# Patient Record
Sex: MALE | Race: WHITE | NOT HISPANIC OR LATINO | Employment: FULL TIME | URBAN - METROPOLITAN AREA
[De-identification: names, ages, dates, MRNs, and addresses within clinical notes are randomized per-mention and may not be internally consistent; named-entity substitution may affect disease eponyms.]

---

## 2017-01-04 ENCOUNTER — TELEPHONE (OUTPATIENT)
Dept: PODIATRY | Facility: CLINIC | Age: 57
End: 2017-01-04

## 2017-01-05 NOTE — TELEPHONE ENCOUNTER
I called and LVM.  Recommend he fax forms to [facility] today, so we can fill out form as quickly as possible, fax numbers given.    Deion Pitts DPM   Podiatric Foot & Ankle Surgeon  [facility]  [phone]

## 2017-01-06 ENCOUNTER — OFFICE VISIT (OUTPATIENT)
Dept: PODIATRY | Facility: CLINIC | Age: 57
End: 2017-01-06
Payer: COMMERCIAL

## 2017-01-06 VITALS — BODY MASS INDEX: 33.41 KG/M2 | HEIGHT: 75 IN | WEIGHT: 268.7 LBS

## 2017-01-06 DIAGNOSIS — L97.529 DIABETIC ULCER OF LEFT FOOT ASSOCIATED WITH TYPE 2 DIABETES MELLITUS (H): Primary | ICD-10-CM

## 2017-01-06 DIAGNOSIS — E11.621 DIABETIC ULCER OF LEFT FOOT ASSOCIATED WITH TYPE 2 DIABETES MELLITUS (H): Primary | ICD-10-CM

## 2017-01-06 PROCEDURE — 11042 DBRDMT SUBQ TIS 1ST 20SQCM/<: CPT | Performed by: PODIATRIST

## 2017-01-06 PROCEDURE — 99213 OFFICE O/P EST LOW 20 MIN: CPT | Mod: 25 | Performed by: PODIATRIST

## 2017-01-06 NOTE — PATIENT INSTRUCTIONS
Follow Up - 3 week follow up     Dr. Pitts's Clinic Locations:         Monday Tuesday   [facility]   [address], [address]   [place] [address] [place], [place] [address] [phone]       Wednesday:  Surgery Day    Surgery Scheduling Line - [phone]       Thursday Morning Thursday Afternoon   [facility]   [address], [address]   [place] [address] [place], [place] [address] [phone]       Friday Morning To Schedule an Appointment    [facility] Call: [phone] [address] [place] [address]  [phone] PLEASE FAX ALL FORMS TO: [phone]     A List of Nail/Callus Care Alternatives  [facility]    [phone]    $40.00 per visit   [facility]   [phone] [address] [place] [address]     Footworks  [phone]  Services the [facility]  $32 per visit   [facility]   Foot Care Clinic    [phone]    At your home or at [place] [address] [place] [address]      [facility]    [phone] [address], [place] [address] [phone] [address], [place] [address]  [facility]    [phone]    Routine foot care for older and diabetic feet by a licensed nurse in your home.      [facility]    [place]  [address]   [place] [address]  Ph: [phone]  Fax: [phone]    [place] ([facility])  [address]  [place] [address]  Ph: [phone]  Fax: [phone]    [place]  [address], [address]   [place] [address]  Ph: [phone]  Fax: [phone]    [address]   [place] [address]  Ph: [phone]  Fax:  [phone]    [place]  [address]   [place] [address]  Ph: [phone]  Fax: [phone]    [facility]   [address], [place]: [phone]    [place] (knee walkers and power scooters)   [address] [facility] [address] [place]: [phone]  [address]: [phone].      Dr. Case's Clinic Locations    Monday Tuesday  [facility]  [address]         [address]  [place] [address]      [place] [address]  Ph:  [phone]      Ph: [phone]  Fax: [phone]      Fax: [phone]    Wednesday Thursday - Surgery Day  [facility]     Call Megan @ [phone]  [address] [place] [address]        [place] [address]  Ph: [phone]  Fax: [phone]

## 2017-01-06 NOTE — PROGRESS NOTES
"Foot & Ankle Surgery  January 6, 2017    S:  Patient in today approx 7 days sp biopsy of bone left foot by Dr Case.  Pain levels minimal.  NWB with crutches.  Would like a note to return to work from home, seated duties.  [facility] nursing for wound cares, using aquacel Ag.  Need order for wound care supplies    Ht 6' 3\" (1.905 m)  Wt 268 lb 11.2 oz (121.882 kg)  BMI 33.59 kg/m2      ROS - positive for CC.  Patient denies current nausea, vomiting, chills, fevers, belly pain, calf pain, chest pain or SOB.  Complete remainder of ROS is otherwise neg.    PE - wound is full-thickness, 1.6 x 1.1cm x 0.7cm.  No probing to bone, no SOI.  Dorsal incisions from biopsies are healed well without drainage/SOI.  charcot.  Skin shows no trophic, color or temperature changes otherwise.  No calf redness, swelling or pain noted otherwise.    Imaging - IMPRESSION:  1. Penetrating soft tissue wound along the plantar aspect of the  midfoot beneath the calcaneus. This extends nearly to the peroneus  longus tendon. Minimal peroneal tendon sheath fluid is noted on the  proximal edge of the scan. No other evidence of rim-enhancing soft  tissue abscess.  2. Marked irregularity, subchondral cysts, and subchondral marrow  edema in the fourth/fifth tarsometatarsal joint and talonavicular  joint. Given proximity to this wound, osteomyelitis/septic arthritis  cannot be entirely excluded. However, this may be related to  developing neuroarthropathy rather than infection.  3. Soft tissue edema and enhancement. It should be noted that MR  cannot distinguish reactive edema from cellulitis.  4. Lack of soft tissue enhancement along the plantar lateral aspect of  the midfoot. This is of uncertain significance but raises the  possibility of lateral midfoot ischemia.    Pathology - FINAL DIAGNOSIS:   A., B., C. Bone, left foot metatarsal, left foot fourth metatarsal, and   left foot cuboid, biopsies.   - Small fragments of bone with attached " paracortical tissues distorted   by biopsy artifact.  No morphologic evidence of osteomyelitis.  Clinical   and culture correlation is required.    Cultures -   Culture Micro (Abnormal)      Moderate growth Beta hemolytic Streptococcus group B   Light growth Staphylococcus aureus   Light growth Strain 2 Staphylococcus aureus      Culture Micro (Abnormal)      On day 2, isolated in broth only: Pseudomonas aeruginosa not isolated or reported    on routine culture   No anaerobes isolated          A/P - 56 year old yo patient approx 1 week sp above procedure  -sutures removed, no s-s needed  -Excisional debridement was performed to the wound, full-thickness, sharply debridement the wound down to and including exposed sub-cutaneous tissue/fat, excising nonviable tissue to the above dimensions with a tissue nipper  -they are out of wound care supplies and aquacel Ag.  [facility]el Ag [facility], order for dressing supplies dispensed to patient/family  -note to return to seated work duties from home today, faxed to employer  -discussed NWB for ulcer healing  -discussed conservative(orthotics, CROW) vs surgical(resection of prominent bone vs Charcot reconstruction) once wound is healed     Follow up  -  3 weeks     Plan for izyroc-ow-ygti - today, seated duties, work from [place]lin    Billing based on time > 25 min, more than 50% spent c/c cares, excluding procedure time.    Body mass index is 33.59 kg/(m^2).  Weight management plan: Patient was referred to their PCP to discuss a diet and exercise plan.      Deion Pitts DPM   Podiatric Foot & Ankle Surgeon  [facility]  [phone]

## 2017-01-06 NOTE — MR AVS SNAPSHOT
After Visit Summary   1/6/2017    Riki Zepeda    MRN: [id number]           Patient Information     Date Of Birth          1960        Visit Information        Provider Department      1/6/2017 11:30 AM Deion Pitts DPM [facility]        Today's Diagnoses     Diabetic ulcer of left foot associated with type 2 diabetes mellitus (H)    -  1       Care Instructions    Follow Up - 3 week follow up     Dr. Pitts's Clinic Locations:         Monday Tuesday   [facility]   [address], [address]   [place] [address] [place] [address]   [phone]       Wednesday:  Surgery Day    Surgery Scheduling Line - [phone]       Thursday Morning Thursday Afternoon   [facility]   [address] [facility] [address] [facility], [address]   [place] [address] [place], [place] [address] [phone]       Friday Morning To Schedule an Appointment    [facility] Call: [phone] [address] [place] [address]  [phone] PLEASE FAX ALL FORMS TO: [phone]     A List of Nail/Callus Care Alternatives  [facility]    [phone]    $40.00 per visit   [facility]   [phone]  [address] [place] [address]     Footworks  [phone]  Services the [facility]  $32 per visit   [facility]   Foot Care Clinic    [phone]    At your home or at [facility]   [address] [place] [address]      [facility]    [phone] [address], [place], [place] [address] [phone] [address], [place] [address]  [facility]    [phone]    Routine foot care for older and diabetic feet by a licensed nurse in your home.      [facility]    [place]  [address]   [place] [address]  Ph: [phone]  Fax: [phone]    [place] ([facility])  [address]  [place] [address]  Ph: [phone]  Fax: [phone]    [place]  [address]   [place] [address]  Ph: [phone]  Fax: [phone]    [place]  [address]    [address]  Ph: [phone]  Fax: [phone]    [place]  [address]   [place] [address]  Ph: [phone]  Fax: [phone]    [facility]   [address] [place]: [phone]    [place] (knee walkers and power scooters)   [address], [place]: [phone]  [address] [place]: [phone].            Follow-ups after your visit        Who to contact     If you have questions or need follow up information about today's clinic visit or your schedule please contact [facility] directly at [phone].  Normal or non-critical lab and imaging results will be communicated to you by stickappshart, letter or phone within 4 business days after the clinic has received the results. If you do not hear from us within 7 days, please contact the clinic through stickappshart or phone. If you have a critical or abnormal lab result, we will notify you by phone as soon as possible.  Submit refill requests through Pirate Pay or call your pharmacy and they will forward the refill request to us. Please allow 3 business days for your refill to be completed.          Additional Information About Your Visit        Pirate Pay Information     Pirate Pay lets you send messages to your doctor, view your test results, renew your prescriptions, schedule appointments and more. To sign up, go to [email or web address] . Click on \"Log in\" on the left side of the screen, which will take you to the Welcome page. Then click on \"Sign up Now\" on the right side of the page.     You will be asked to enter the access code listed below, as well as " "some personal information. Please follow the directions to create your username and password.     Your access code is: [email or web address]  Expires: 2017  2:42 PM     Your access code will  in 90 days. If you need help or a new code, please call your [facility] or [phone].        Care EveryWhere ID     This is your Care EveryWhere ID. This could be used by other organizations to access your [place] medical records  [id number]        Your Vitals Were     Height BMI (Body Mass Index)                6' 3\" (1.905 m) 33.59 kg/m2           Blood Pressure from Last 3 Encounters:   17 136/73   16 117/77    Weight from Last 3 Encounters:   17 268 lb 11.2 oz (121.882 kg)   17 268 lb 11.2 oz (121.882 kg)   16 240 lb (108.863 kg)              We Performed the Following     WOUND CARE SUPPLIES        Primary Care Provider Office Phone # Fax #    Shannan [facility] [phone]       [facility] [place] [id number]        Thank you!     Thank you for choosing [facility]  for your care. Our goal is always to provide you with excellent care. Hearing back from our patients is one way we can continue to improve our services. Please take a few minutes to complete the written survey that you may receive in the mail after your visit with us. Thank you!             Your Updated Medication List - Protect others around you: Learn how to safely use, store and throw away your medicines at www.disposemymeds.org.          This list is accurate as of: 17 12:06 PM.  Always use your most recent med list.                   Brand Name Dispense Instructions for use    amoxicillin-clavulanate 875-125 MG per tablet    AUGMENTIN    28 tablet    Take 1 tablet by mouth 2 times daily for 14 days       ciprofloxacin 500 MG tablet    CIPRO    28 tablet    Take 1 tablet (500 mg) by mouth 2 times daily for 14 days       ibuprofen 200 MG tablet    ADVIL/MOTRIN     Take 600 " mg by mouth every 6 hours as needed for mild pain       insulin glargine 100 UNIT/ML injection    LANTUS     Inject 42 Units Subcutaneous At Bedtime       insulin pen needle 31G X 6 MM     100 each    Use 4 daily or as directed.       METFORMIN HCL PO      Take 1,000 mg by mouth 2 times daily (with meals)       silver sulfADIAZINE 1 % cream    SILVADENE     Apply topically daily Apply to L foot wound

## 2017-01-23 ENCOUNTER — OFFICE VISIT (OUTPATIENT)
Dept: PODIATRY | Facility: CLINIC | Age: 57
End: 2017-01-23
Payer: COMMERCIAL

## 2017-01-23 VITALS — WEIGHT: 268 LBS | HEIGHT: 75 IN | BODY MASS INDEX: 33.32 KG/M2 | RESPIRATION RATE: 16 BRPM

## 2017-01-23 DIAGNOSIS — L97.529 DIABETIC ULCER OF LEFT FOOT ASSOCIATED WITH TYPE 2 DIABETES MELLITUS (H): Primary | ICD-10-CM

## 2017-01-23 DIAGNOSIS — E11.621 DIABETIC ULCER OF LEFT FOOT ASSOCIATED WITH TYPE 2 DIABETES MELLITUS (H): Primary | ICD-10-CM

## 2017-01-23 PROCEDURE — 99213 OFFICE O/P EST LOW 20 MIN: CPT | Performed by: PODIATRIST

## 2017-01-23 NOTE — PROGRESS NOTES
"Foot & Ankle Surgery  January 23, 2017    S:  Patient in today approx 3 1/2 weeks sp wound debridement and bone biopsy.  Pain levels neg.  Doing daily aquacel Ag dressing changes, NWB on crutches, and is working from home.  His wife is asking about nail fungus    Resp 16  Ht 6' 3\" (1.905 m)  Wt 268 lb (121.564 kg)  BMI 33.50 kg/m2      ROS - positive for CC.  Patient denies current nausea, vomiting, chills, fevers, belly pain, calf pain, chest pain or SOB.  Complete remainder of ROS is otherwise neg.    PE - 1.4 x 1.0cm, full-thickness centrally but otherwise partial thickness, granular base after debridement, no SOI/purulence.  Charcot collpase with plantar prominence.  Multiple nails thickened, dystrophic L foot  Skin shows no trophic, color or temperature changes otherwise.  No calf redness, swelling or pain noted otherwise.      A/P - 57 year old yo patient approx 3 1/2 sp above procedure  -Excisional debridement was performed to the wound, full-thickness, sharply debridement the wound down to and including exposed sub-cutaneous tissue/fat, excising nonviable tissue to the above dimensions with a tissue nipper  -continue daily cares:  Wash/dry, aquacel Ag, gauze dressing  -NWB  -discussed dystrophic nails, possible fungus, discussed culture and treatment briefly.  She just wants to make sure this doesn't infect the plantar wound.  No specific precautions other than cleaning wound  -asked about going to [facility]; they can be seen there for a 2nd opinion if they would like; otehrwise, we can treat in our clinic  -note to continue working from home x 2 months, will get back to work sooner if need be     Follow up  -  2 weeks     Plan for ohadmd-yg-oczh - working from home      Body mass index is 33.5 kg/(m^2).  Weight management plan: Patient was referred to their PCP to discuss a diet and exercise plan.      Deion Pitts, SINAN   Podiatric Foot & Ankle Surgeon  [facility]  [phone]

## 2017-01-23 NOTE — NURSING NOTE
"Chief Complaint   Patient presents with     Surgical Followup     DOS 12/29/17 L foot       Initial Resp 16  Ht 6' 3\" (1.905 m)  Wt 268 lb (121.564 kg)  BMI 33.50 kg/m2 Estimated body mass index is 33.5 kg/(m^2) as calculated from the following:    Height as of this encounter: 6' 3\" (1.905 m).    Weight as of this encounter: 268 lb (121.564 kg).    Berna Gilman CMA ([facility])  Podiatry/Foot & Ankle Surgery  [facility]      "

## 2017-01-23 NOTE — MR AVS SNAPSHOT
"              After Visit Summary   1/23/2017    Riki Zepeda    MRN: [id number]           Patient Information     Date Of Birth          1960        Visit Information        Provider Department      1/23/2017 8:30 AM Deion Pitts DPM [facility]         Follow-ups after your visit        Your next 10 appointments already scheduled     Feb 06, 2017  4:00 PM   Return Visit with Deion Pitts DPM   [facility] ([facility])    [address]  [address]  [facility] [address] [phone]              Who to contact     If you have questions or need follow up information about today's clinic visit or your schedule please contact [facility] directly at [phone].  Normal or non-critical lab and imaging results will be communicated to you by MyChart, letter or phone within 4 business days after the clinic has received the results. If you do not hear from us within 7 days, please contact the clinic through MyChart or phone. If you have a critical or abnormal lab result, we will notify you by phone as soon as possible.  Submit refill requests through Mobile-XL or call your pharmacy and they will forward the refill request to us. Please allow 3 business days for your refill to be completed.          Additional Information About Your Visit        Qoviahart Information     Mobile-XL lets you send messages to your doctor, view your test results, renew your prescriptions, schedule appointments and more. To sign up, go to [email or web address] . Click on \"Log in\" on the left side of the screen, which will take you to the Welcome page. Then click on \"Sign up Now\" on the right side of the page.     You will be asked to enter the access code listed below, as well as some personal information. Please follow the directions to create your username and password.     Your access code is: [email or web address]  Expires: 4/2/2017  2:42 PM     Your access code will " " in 90 days. If you need help or a new code, please call your [place] clinic or [phone].        Care EveryWhere ID     This is your Care EveryWhere ID. This could be used by other organizations to access your [place] medical records  [id number]        Your Vitals Were     Respirations Height BMI (Body Mass Index)             16 6' 3\" (1.905 m) 33.50 kg/m2          Blood Pressure from Last 3 Encounters:   17 136/73   16 117/77    Weight from Last 3 Encounters:   17 268 lb (121.564 kg)   17 268 lb 11.2 oz (121.882 kg)   17 268 lb 11.2 oz (121.882 kg)              Today, you had the following     No orders found for display       Primary Care Provider Office Phone # Fax #    Shannan [facility] [phone]       [facility] [address]        Thank you!     Thank you for choosing [facility]  for your care. Our goal is always to provide you with excellent care. Hearing back from our patients is one way we can continue to improve our services. Please take a few minutes to complete the written survey that you may receive in the mail after your visit with us. Thank you!             Your Updated Medication List - Protect others around you: Learn how to safely use, store and throw away your medicines at www.disposemymeds.org.          This list is accurate as of: 17 10:13 AM.  Always use your most recent med list.                   Brand Name Dispense Instructions for use    ibuprofen 200 MG tablet    ADVIL/MOTRIN     Take 600 mg by mouth every 6 hours as needed for mild pain       insulin glargine 100 UNIT/ML injection    LANTUS     Inject 42 Units Subcutaneous At Bedtime       insulin pen needle 31G X 6 MM     100 each    Use 4 daily or as directed.       METFORMIN HCL PO      Take 1,000 mg by mouth 2 times daily (with meals)       order for DME     1 Device    Equipment being ordered: RollAbout x 3 months       silver sulfADIAZINE " 1 % cream    SILVADENE     Apply topically daily Apply to L foot wound

## 2017-01-23 NOTE — Clinical Note
January 23, 2017      To whom it may concern:    It is ok for Riki Zepeda to continue working from home, where he can elevate and rest his foot, in an attempt to get the wound to heal as soon as possible.  We will continue with working from home x 2 months, but will see him every few weeks during that time to recheck the wound, and will get him back to work sooner if possible.      Thank you        Deion Pitts DPM   Podiatric Foot & Ankle Surgeon  [facility]

## 2017-02-06 ENCOUNTER — OFFICE VISIT (OUTPATIENT)
Dept: PODIATRY | Facility: CLINIC | Age: 57
End: 2017-02-06
Payer: COMMERCIAL

## 2017-02-06 DIAGNOSIS — L97.509 TYPE 2 DIABETES MELLITUS WITH FOOT ULCER, UNSPECIFIED LONG TERM INSULIN USE STATUS: Primary | ICD-10-CM

## 2017-02-06 DIAGNOSIS — E11.621 TYPE 2 DIABETES MELLITUS WITH FOOT ULCER, UNSPECIFIED LONG TERM INSULIN USE STATUS: Primary | ICD-10-CM

## 2017-02-06 PROCEDURE — 99213 OFFICE O/P EST LOW 20 MIN: CPT | Mod: 25 | Performed by: PODIATRIST

## 2017-02-06 PROCEDURE — 97597 DBRDMT OPN WND 1ST 20 CM/<: CPT | Performed by: PODIATRIST

## 2017-02-06 NOTE — PROGRESS NOTES
Foot & Ankle Surgery   February 6, 2017    S:  Pt is seen today for evaluation of ulcer plantar L midfoot.  NWB, although patient states he has put some weight on the foot, and qod dressings with Aquacel Ag, wound has been steadily improving.  Continues to work from home. No acute changes.    There were no vitals filed for this visit.'      ROS - Pos for CC.  Patient denies current nausea, vomiting, chills, fevers, belly pain, calf pain, chest pain or SOB.  Complete remainder of ROS it otherwise neg.      PE:  Gen:   No apparent distress  Neuro:   A&Ox3, no deficits  Psych:    Answering questions appropriately for age and situation with normal affect  Head:    NCAT  Eye:    Visual scanning without deficit  Ear:    Response to auditory stimuli wnl  Lung:    Non-labored breathing on RA noted  Abd:    NTND per patient report  Lymph:    Neg for pitting/non-pitting edema BLE  Vasc:    Pulses weakly palpable, CFT minimally delayed  Neuro:    Light touch sensation greatly diminished distally  Derm:  Ulcer is down to 1.0 x 0.9cm, partial thickness, with 100% granular base after debridement.  No deep probing, zero SOI  MSK:    Charcot foot without acute changes  Calf:    Neg for redness, swelling or tenderness    Assessment:  57 year old male with DMII, neuropathy; charcot foot; ulcer L foot      Plan:  Discussed etiologies/options  1.  DMII, neuropathy  -tight glycemic control    2.  Ulcer L foot  -Excisional debridement was performed to the wound, partial-thickness(limited to skin breakdown), sharply debridement the wound, excising nonviable tissue to the above dimensions with a tissue nipper  -continue qod dressing change, Aquacel Ag  -ok to put lotion on dry skin surrounding ulcer    3.  Charcot fot  -discussed extra-depth orthotics/CROW for long-term pressure relief to prevent recurrence.  -discussed conservative surgical options, bump resection, vs arch reconstructions  -recommend offloading for now, although he has  used a CROW walker in the past     Follow up:  2 weeks    Billing basedon time > 15 min, more than 50% spent c/c cares, excluding procedure time.      There is no weight on file to calculate BMI.  Weight management plan: Patient was referred to their PCP to discuss a diet and exercise plan.         Deion Pitts DPM   Podiatric Foot & Ankle Surgeon  [facility]  [phone]

## 2017-02-06 NOTE — PATIENT INSTRUCTIONS
Follow Up - 2 weeks    Dr. Pitts's Clinic Locations:         Monday Tuesday   [facility]   [address], [address]   [place] [address] [place] [address]   [phone]       Wednesday:  Surgery Day    Surgery Scheduling Line - [phone]       Thursday Morning Thursday Afternoon   [place]   [address], [address]   [place] [address] [place] [address]   [phone]       Friday Morning To Schedule an Appointment    [facility] Call: [phone] [address] [place] [address]  [phone] PLEASE FAX ALL FORMS TO: [phone]

## 2017-02-06 NOTE — MR AVS SNAPSHOT
After Visit Summary   2/6/2017    Riki Zepeda    MRN: [id number]           Patient Information     Date Of Birth          1960        Visit Information        Provider Department      2/6/2017 4:00 PM Deion Pitts DPM [facility]        Today's Diagnoses     Type 2 diabetes mellitus with foot ulcer, unspecified long term insulin use status (H)    -  1       Care Instructions    Follow Up - 2 weeks    Dr. Pitts's Clinic Locations:         Monday Tuesday   [facility]   [address], [address]   [place] [address] [place] [address]   [phone]       Wednesday:  Surgery Day    Surgery Scheduling Line - [phone]       Thursday Morning Thursday Afternoon   [facility]   [address]   [place] [address] [place], [place] [address] [phone]       Friday Morning To Schedule an Appointment    [facility] Call: [phone] [address] [place] [address]  [phone] PLEASE FAX ALL FORMS TO: [phone]             Follow-ups after your visit        Who to contact     If you have questions or need follow up information about today's clinic visit or your schedule please contact [facility] directly at [phone].  Normal or non-critical lab and imaging results will be communicated to you by MyChart, letter or phone within 4 business days after the clinic has received the results. If you do not hear from us within 7 days, please contact the clinic through EnOceanhart or phone. If you have a critical or abnormal lab result, we will notify you by phone as soon as possible.  Submit refill requests through Pipette or call your pharmacy and they will forward the refill request to us. Please allow 3 business days for your refill to be completed.           "Additional Information About Your Visit        MyChart Information     SkillsTrak lets you send messages to your doctor, view your test results, renew your prescriptions, schedule appointments and more. To sign up, go to [email or web address] . Click on \"Log in\" on the left side of the screen, which will take you to the Welcome page. Then click on \"Sign up Now\" on the right side of the page.     You will be asked to enter the access code listed below, as well as some personal information. Please follow the directions to create your username and password.     Your access code is: [email or web address]  Expires: 2017  2:42 PM     Your access code will  in 90 days. If you need help or a new code, please call your [facility] clinic or [phone].        Care EveryWhere ID     This is your Care EveryWhere ID. This could be used by other organizations to access your [facility] medical records  [id number]         Blood Pressure from Last 3 Encounters:   17 136/73   16 117/77    Weight from Last 3 Encounters:   17 268 lb (121.564 kg)   17 268 lb 11.2 oz (121.882 kg)   17 268 lb 11.2 oz (121.882 kg)              We Performed the Following     DEBRIDEMENT WOUND UP TO 20 SQ CM        Primary Care Provider Office Phone # Fax #    Shannan Dennis [facility] [phone]       [facility] [address]        Thank you!     Thank you for choosing [facility]  for your care. Our goal is always to provide you with excellent care. Hearing back from our patients is one way we can continue to improve our services. Please take a few minutes to complete the written survey that you may receive in the mail after your visit with us. Thank you!             Your Updated Medication List - Protect others around you: Learn how to safely use, store and throw away your medicines at www.disposemymeds.org.          This list is accurate as of: 17  4:24 PM.  Always use your most recent " med list.                   Brand Name Dispense Instructions for use    ibuprofen 200 MG tablet    ADVIL/MOTRIN     Take 600 mg by mouth every 6 hours as needed for mild pain       insulin glargine 100 UNIT/ML injection    LANTUS     Inject 42 Units Subcutaneous At Bedtime       insulin pen needle 31G X 6 MM     100 each    Use 4 daily or as directed.       METFORMIN HCL PO      Take 1,000 mg by mouth 2 times daily (with meals)       order for DME     1 Device    Equipment being ordered: RollAbout x 3 months       silver sulfADIAZINE 1 % cream    SILVADENE     Apply topically daily Apply to L foot wound

## 2017-03-06 ENCOUNTER — OFFICE VISIT (OUTPATIENT)
Dept: PODIATRY | Facility: CLINIC | Age: 57
End: 2017-03-06
Payer: COMMERCIAL

## 2017-03-06 VITALS — BODY MASS INDEX: 33.32 KG/M2 | HEART RATE: 78 BPM | WEIGHT: 268 LBS | HEIGHT: 75 IN | RESPIRATION RATE: 16 BRPM

## 2017-03-06 DIAGNOSIS — L97.521 ULCER OF LEFT FOOT, LIMITED TO BREAKDOWN OF SKIN (H): Primary | ICD-10-CM

## 2017-03-06 PROCEDURE — 97597 DBRDMT OPN WND 1ST 20 CM/<: CPT | Performed by: PODIATRIST

## 2017-03-06 PROCEDURE — 99212 OFFICE O/P EST SF 10 MIN: CPT | Mod: 25 | Performed by: PODIATRIST

## 2017-03-06 NOTE — PROGRESS NOTES
"Foot & Ankle Surgery   March 6, 2017    S:  Pt is seen today for evaluation of L foot ulcer.  He's been trying to keep the weight off the foot, using a CAM and crutches, but admits to standing/putting weight on it intermittently.  He's doing daily Aquacel AG dressing changes.    Vitals:    03/06/17 1129   Pulse: 78   Resp: 16   Weight: 268 lb (121.6 kg)   Height: 6' 3\" (1.905 m)   '      ROS - Pos for CC.  Patient denies current nausea, vomiting, chills, fevers, belly pain, calf pain, chest pain or SOB.  Complete remainder of ROS it otherwise neg.      PE:  Gen:   No apparent distress  Neuro:   A&Ox3, no deficits  Psych:    Answering questions appropriately for age and situation with normal affect  Head:    NCAT  Eye:    Visual scanning without deficit  Ear:    Response to auditory stimuli wnl  Lung:    Non-labored breathing on RA noted  Abd:    NTND per patient report  Lymph:    Neg for pitting/non-pitting edema BLE  Vasc:    Pulses palpable, CFT minimally delayed  Neuro:    Light touch sensation greatly diminished distally left lower extremity   Derm:  Ulcer plantar L midfoot, partial thickness, 10 x 6mm, granular base after debridement.  Moderate amount of callus tissue around the ulcer.  Small superficial blister lateral to wound, no deep probing, no SOI  MSK:  Baseline charcot changes L foot, no acute instability.  Calf:    Neg for redness, swelling or tenderness      Assessment:  57 year old male with ulcer L foot      Plan:  Discussed etiologies/options  1.  Ulcer plantar L midfoot  -Excisional debridement was performed to the wound, partial-thickness(limited to skin breakdown), sharply debridement the wound, excising nonviable tissue to the above dimensions with a tissue nipper  -continue Aquacel AG daily dressing changes  -again discussed importance of keeping weight off the foot  -briefly discussed conservative vs surgical options for keeping wound healing, once if fully epithelializes.      Follow up:  3 " weeks    Billing based on time > 10m, more than 50% spent c/c cares, excluding procedure time      Body mass index is 33.5 kg/(m^2).  Weight management plan: Patient was referred to their PCP to discuss a diet and exercise plan.         Deion Pitts DPM   Podiatric Foot & Ankle Surgeon  [facility]  [phone]

## 2017-03-06 NOTE — MR AVS SNAPSHOT
After Visit Summary   3/6/2017    Riki Zepeda    MRN: [id number]           Patient Information     Date Of Birth          1960        Visit Information        Provider Department      3/6/2017 11:30 AM Deion Pitts DPM [facility]        Care Instructions      Dr. Pitts's Clinic Locations:         Monday Tuesday   [facility]   [address]   [place] [address] [place] [address]   [phone]       Wednesday:  Surgery Day    Surgery Scheduling Line - [phone]       Thursday Morning Thursday Afternoon   [facility]   [address]   [place] [address] [place] [address]   [phone]       Friday Morning To Schedule an Appointment    [facility] Call: [phone] [address] [place] [address]  [phone] PLEASE FAX ALL FORMS TO: [phone]     Follow up: 3 weeks        Body Mass Index (BMI)    Many things can cause foot and ankle problems. Foot structure, activity level, foot mechanics and injuries are common causes of pain.    One very important issue that often goes unmentioned, is body weight.  Extra weight can cause increased stress on muscles, ligaments, bones and tendons. Sometimes just a few extra pounds is all it takes to put one over her/his threshold. Without reducing that stress, it can be difficult to alleviate pain.      Some people are uncomfortable addressing this issue, but we feel it is important for you to think about it. As Foot & Ankle specialists, our job is addressing the lower extremity problem and possible causes.     Regarding extra body weight, we encourage patients to discuss diet and weight management plans with their primary care doctors. It is this team approach that gives you the best opportunity for pain  "relief and getting you back on your feet.                Follow-ups after your visit        Your next 10 appointments already scheduled     Mar 27, 2017 11:00 AM CDT   Return Visit with Deion Pitts DPM   [facility]    [address]  [address]  [place] [address] [phone]              Who to contact     If you have questions or need follow up information about today's clinic visit or your schedule please contact [facility] directly at [phone].  Normal or non-critical lab and imaging results will be communicated to you by Indow Windowshart, letter or phone within 4 business days after the clinic has received the results. If you do not hear from us within 7 days, please contact the clinic through Hudlt or phone. If you have a critical or abnormal lab result, we will notify you by phone as soon as possible.  Submit refill requests through [facility] or call your pharmacy and they will forward the refill request to us. Please allow 3 business days for your refill to be completed.          Additional Information About Your Visit        [facility] Information     [facility] lets you send messages to your doctor, view your test results, renew your prescriptions, schedule appointments and more. To sign up, go to [email or web address] . Click on \"Log in\" on the left side of the screen, which will take you to the Welcome page. Then click on \"Sign up Now\" on the right side of the page.     You will be asked to enter the access code listed below, as well as some personal information. Please follow the directions to create your username and password.     Your access code is: [email or web address]  Expires: 2017  2:42 PM     Your access code will  in 90 days. If you need help or a new code, please call your [facility] or [phone].        Care EveryWhere ID     This is your Care EveryWhere ID. This could be used by other organizations to access your " "[facility] medical records  [id number]        Your Vitals Were     Pulse Respirations Height BMI (Body Mass Index)          78 16 6' 3\" (1.905 m) 33.5 kg/m2         Blood Pressure from Last 3 Encounters:   01/02/17 136/73   08/23/16 117/77    Weight from Last 3 Encounters:   03/06/17 268 lb (121.6 kg)   01/23/17 268 lb (121.6 kg)   01/06/17 268 lb 11.2 oz (121.9 kg)              Today, you had the following     No orders found for display       Primary Care Provider Office Phone # Fax #    Shannan Dennis [facility] [phone]       [facility] [address]        Thank you!     Thank you for choosing [facility]  for your care. Our goal is always to provide you with excellent care. Hearing back from our patients is one way we can continue to improve our services. Please take a few minutes to complete the written survey that you may receive in the mail after your visit with us. Thank you!             Your Updated Medication List - Protect others around you: Learn how to safely use, store and throw away your medicines at www.disposemymeds.org.          This list is accurate as of: 3/6/17 11:40 AM.  Always use your most recent med list.                   Brand Name Dispense Instructions for use    ibuprofen 200 MG tablet    ADVIL/MOTRIN     Take 600 mg by mouth every 6 hours as needed for mild pain       insulin glargine 100 UNIT/ML injection    LANTUS     Inject 42 Units Subcutaneous At Bedtime       insulin pen needle 31G X 6 MM     100 each    Use 4 daily or as directed.       METFORMIN HCL PO      Take 1,000 mg by mouth 2 times daily (with meals)       order for DME     1 Device    Equipment being ordered: RollAbout x 3 months       silver sulfADIAZINE 1 % cream    SILVADENE     Apply topically daily Apply to L foot wound         "

## 2017-03-06 NOTE — NURSING NOTE
"Chief Complaint   Patient presents with     RECHECK     Left foot wound check       Initial Pulse 78  Resp 16  Ht 6' 3\" (1.905 m)  Wt 268 lb (121.6 kg)  BMI 33.5 kg/m2 Estimated body mass index is 33.5 kg/(m^2) as calculated from the following:    Height as of this encounter: 6' 3\" (1.905 m).    Weight as of this encounter: 268 lb (121.6 kg).  Medication Reconciliation: complete  "

## 2017-03-06 NOTE — PATIENT INSTRUCTIONS
Dr. Pitts's Clinic Locations:         Monday Tuesday   [facility]   [address] [address] [address]   [place] [address] [place] [address]   [phone]       Wednesday:  Surgery Day    Surgery Scheduling Line - [phone]       Thursday Morning Thursday Afternoon   [facility]   [address]   [place] [address] [place] [address]   [phone]       Friday Morning To Schedule an Appointment    [facility] Call: [phone] [address]    [place], [place] [address] [phone] PLEASE FAX ALL FORMS TO: [phone]     Follow up: 3 weeks        Body Mass Index (BMI)    Many things can cause foot and ankle problems. Foot structure, activity level, foot mechanics and injuries are common causes of pain.    One very important issue that often goes unmentioned, is body weight.  Extra weight can cause increased stress on muscles, ligaments, bones and tendons. Sometimes just a few extra pounds is all it takes to put one over her/his threshold. Without reducing that stress, it can be difficult to alleviate pain.      Some people are uncomfortable addressing this issue, but we feel it is important for you to think about it. As Foot & Ankle specialists, our job is addressing the lower extremity problem and possible causes.     Regarding extra body weight, we encourage patients to discuss diet and weight management plans with their primary care doctors. It is this team approach that gives you the best opportunity for pain relief and getting you back on your feet.

## 2017-03-06 NOTE — LETTER
March 6, 2017      To whom it may concern:    Because of a continued wound on his foot, please allow Riki Zepeda to continue working from home, as this has been important for continued wound healing.  We'll continue with work-from-home restrictions x 3 months, but will return him to work sooner if the wound fully heals.      Thank you      Deion Pitts DPM   Podiatric Foot & Ankle Surgeon  [facility]

## 2017-03-27 ENCOUNTER — OFFICE VISIT (OUTPATIENT)
Dept: PODIATRY | Facility: CLINIC | Age: 57
End: 2017-03-27
Payer: COMMERCIAL

## 2017-03-27 VITALS — HEART RATE: 88 BPM | HEIGHT: 75 IN | WEIGHT: 268 LBS | BODY MASS INDEX: 33.32 KG/M2 | RESPIRATION RATE: 16 BRPM

## 2017-03-27 DIAGNOSIS — E08.621 DIABETIC ULCER OF FOOT ASSOCIATED WITH DIABETES MELLITUS DUE TO UNDERLYING CONDITION, WITH NECROSIS OF BONE (H): ICD-10-CM

## 2017-03-27 DIAGNOSIS — L97.504 DIABETIC ULCER OF FOOT ASSOCIATED WITH DIABETES MELLITUS DUE TO UNDERLYING CONDITION, WITH NECROSIS OF BONE (H): ICD-10-CM

## 2017-03-27 DIAGNOSIS — L03.116 CELLULITIS OF LEFT FOOT: Primary | ICD-10-CM

## 2017-03-27 PROCEDURE — 97597 DBRDMT OPN WND 1ST 20 CM/<: CPT | Performed by: PODIATRIST

## 2017-03-27 PROCEDURE — 99212 OFFICE O/P EST SF 10 MIN: CPT | Mod: 25 | Performed by: PODIATRIST

## 2017-03-27 RX ORDER — CLINDAMYCIN HCL 150 MG
150 CAPSULE ORAL 3 TIMES DAILY
Qty: 30 CAPSULE | Refills: 0 | Status: SHIPPED | OUTPATIENT
Start: 2017-03-27 | End: 2017-05-30

## 2017-03-27 NOTE — LETTER
March 27, 2017      To whom it may concern:    I gave Riki Zepeda a Rx for a RollAbout knee scooter, but this was declined.  I consider this medically necessary for healing of his left foot diabetic ulceration, because he has shoulder issues that make utilizing crutches very difficult.    Thank you        REY CurryM   Podiatric Foot & Ankle Surgeon  [facility]

## 2017-03-27 NOTE — PROGRESS NOTES
"Foot & Ankle Surgery   March 27, 2017    S:  Pt is seen today for evaluation of L foot ulcer, nearly 3 months sp I&D by Dr Case.  His RollAbout wasn't covered.  He's developed low-grade erythema on the foot.  States he's mostly been off the foot, but does put weight on it for walking here and there.    Vitals:    03/27/17 1058   Pulse: 88   Resp: 16   Weight: 268 lb (121.6 kg)   Height: 6' 3\" (1.905 m)   '      ROS - Pos for CC.  Patient denies current nausea, vomiting, chills, fevers, belly pain, calf pain, chest pain or SOB.  Complete remainder of ROS it otherwise neg.      PE:  Gen:   No apparent distress  Neuro:   A&Ox3, no deficits  Psych:    Answering questions appropriately for age and situation with normal affect  Head:    NCAT  Eye:    Visual scanning without deficit  Ear:    Response to auditory stimuli wnl  Lung:    Non-labored breathing on RA noted  Abd: NTND per patient report  Lymph: Neg for pitting/non-pitting edema BLE  Vasc: Pulses palpable, CFT minimally delayed  Neuro: Light touch sensation greatly diminished distally left lower extremity   Derm: Ulcer plantar L midfoot, partial thickness, 9 x 5mm, granular base after debridement. Slight erythema along medial arch noted.  No deep probing, partial thickness wound  MSK: Baseline charcot changes L foot, no acute instability.  Calf: Neg for redness, swelling or tenderness      Cultures:    Culture Micro (Abnormal) 12/29/2016  2:23    Moderate growth Beta hemolytic Streptococcus group B   Light growth Staphylococcus aureus   Light growth Strain 2 Staphylococcus aureus     See EPIC for sensitivities.  All sensitive to Clindamycin      Assessment:  57 year old male with DMII, neuropathy, ulcer, infection      Plan:  Discussed etiologies/options  1.  Infected DMII, neuropathic ulcer L foot  -Excisional debridement was performed to the wound, partial-thickness(limited to skin breakdown), sharply debridement the wound, excising nonviable tissue to " the above dimensions with a tissue nipper  -Rx for Clindamycin 150mg TID x 10 days   -letter of medical necessity for his RollAbout    Follow up:  1 week      Body mass index is 33.5 kg/(m^2).  Weight management plan: Patient was referred to their PCP to discuss a diet and exercise plan.         Deion Pitts DPM   Podiatric Foot & Ankle Surgeon  [facility]  [phone]

## 2017-03-27 NOTE — NURSING NOTE
"Chief Complaint   Patient presents with     RECHECK     L foot wound f/u, states it is improving, roll-about is not covered       Initial Pulse 88  Resp 16  Ht 6' 3\" (1.905 m)  Wt 268 lb (121.6 kg)  BMI 33.5 kg/m2 Estimated body mass index is 33.5 kg/(m^2) as calculated from the following:    Height as of this encounter: 6' 3\" (1.905 m).    Weight as of this encounter: 268 lb (121.6 kg).  Medication Reconciliation: complete  "

## 2017-03-27 NOTE — MR AVS SNAPSHOT
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      Your next 10 appointments already scheduled     Apr 03, 2017 11:00 AM CDT   Return Visit with Deion Pitts DPM   [facility]    [address]  [address]  [facility] [address] [phone]              Who to contact     If you have questions or need follow up information about today's clinic visit or your schedule please contact [facility] directly at [phone].  Normal or non-critical lab and imaging results will be communicated to you by Amitreehart, letter or phone within 4 business days after the clinic has received the results. If you do not hear from us within 7 days, please contact the clinic through visetot or phone. If you have a critical or abnormal lab result, we will notify you by phone as soon as possible.  Submit refill requests through makerist or call your pharmacy and they will forward the refill request to us. Please allow 3 business days for your refill to be completed.          Additional Information About Your Visit        Amitreehart Information     makerist lets you send messages to your doctor, view your test results, renew your prescriptions, schedule appointments and more. To sign up, go to [email or web address] . Click on \"Log in\" on the left side of the screen, which will take you to the Welcome page. Then click on \"Sign up Now\" on the right side " "of the page.     You will be asked to enter the access code listed below, as well as some personal information. Please follow the directions to create your username and password.     Your access code is: [email or web address]  Expires: 2017  3:42 PM     Your access code will  in 90 days. If you need help or a new code, please call your [facility] or [phone].        Care EveryWhere ID     This is your Care EveryWhere ID. This could be used by other organizations to access your [facility] medical records  [id number]        Your Vitals Were     Pulse Respirations Height BMI (Body Mass Index)          88 16 6' 3\" (1.905 m) 33.5 kg/m2         Blood Pressure from Last 3 Encounters:   17 136/73   16 117/77    Weight from Last 3 Encounters:   17 268 lb (121.6 kg)   17 268 lb (121.6 kg)   17 268 lb (121.6 kg)              We Performed the Following     DEBRIDEMENT WOUND UP TO 20 SQ CM          Today's Medication Changes          These changes are accurate as of: 3/27/17 11:29 AM.  If you have any questions, ask your nurse or doctor.               Start taking these medicines.        Dose/Directions    clindamycin 150 MG capsule   Commonly known as:  CLEOCIN   Used for:  Cellulitis of left foot   Started by:  Deion Pitts DPM        Dose:  150 mg   Take 1 capsule (150 mg) by mouth 3 times daily   Quantity:  30 capsule   Refills:  0            Where to get your medicines      These medications were sent to [facility] [address]  [place] - [address] AT [place] [address], [place] [address]     Phone:  [phone]     clindamycin 150 MG capsule                Primary Care Provider Office Phone # Fax #    Shannan [place] [facility] [phone]       [address] [place] [address]  [place] [address]        Thank you!     Thank you for choosing [facility]  for your care. Our goal is always to provide you with excellent care. " Hearing back from our patients is one way we can continue to improve our services. Please take a few minutes to complete the written survey that you may receive in the mail after your visit with us. Thank you!             Your Updated Medication List - Protect others around you: Learn how to safely use, store and throw away your medicines at www.disposemymeds.org.          This list is accurate as of: 3/27/17 11:29 AM.  Always use your most recent med list.                   Brand Name Dispense Instructions for use    clindamycin 150 MG capsule    CLEOCIN    30 capsule    Take 1 capsule (150 mg) by mouth 3 times daily       ibuprofen 200 MG tablet    ADVIL/MOTRIN     Take 600 mg by mouth every 6 hours as needed for mild pain       insulin glargine 100 UNIT/ML injection    LANTUS     Inject 42 Units Subcutaneous At Bedtime       insulin pen needle 31G X 6 MM     100 each    Use 4 daily or as directed.       METFORMIN HCL PO      Take 1,000 mg by mouth 2 times daily (with meals)       order for DME     1 Device    Equipment being ordered: RollAbout x 3 months       silver sulfADIAZINE 1 % cream    SILVADENE     Apply topically daily Apply to L foot wound

## 2017-04-03 ENCOUNTER — OFFICE VISIT (OUTPATIENT)
Dept: PODIATRY | Facility: CLINIC | Age: 57
End: 2017-04-03
Payer: COMMERCIAL

## 2017-04-03 VITALS — HEIGHT: 75 IN | BODY MASS INDEX: 33.32 KG/M2 | WEIGHT: 268 LBS | HEART RATE: 86 BPM

## 2017-04-03 DIAGNOSIS — L97.521 ULCER OF FOOT, LEFT, LIMITED TO BREAKDOWN OF SKIN (H): Primary | ICD-10-CM

## 2017-04-03 PROCEDURE — 99213 OFFICE O/P EST LOW 20 MIN: CPT | Mod: 25 | Performed by: PODIATRIST

## 2017-04-03 PROCEDURE — 97597 DBRDMT OPN WND 1ST 20 CM/<: CPT | Performed by: PODIATRIST

## 2017-04-03 NOTE — MR AVS SNAPSHOT
"              After Visit Summary   4/3/2017    Riki Zepeda    MRN: [id number]           Patient Information     Date Of Birth          1960        Visit Information        Provider Department      4/3/2017 11:00 AM Deion Pitts DPM [facility]        Today's Diagnoses     Ulcer of foot, left, limited to breakdown of skin (H)    -  1       Follow-ups after your visit        Who to contact     If you have questions or need follow up information about today's clinic visit or your schedule please contact [facility] directly at [phone].  Normal or non-critical lab and imaging results will be communicated to you by [employer], letter or phone within 4 business days after the clinic has received the results. If you do not hear from us within 7 days, please contact the clinic through [employer] or phone. If you have a critical or abnormal lab result, we will notify you by phone as soon as possible.  Submit refill requests through [employer] or call your pharmacy and they will forward the refill request to us. Please allow 3 business days for your refill to be completed.          Additional Information About Your Visit        MyChart Information     P[employer] lets you send messages to your doctor, view your test results, renew your prescriptions, schedule appointments and more. To sign up, go to [email or web address] . Click on \"Log in\" on the left side of the screen, which will take you to the Welcome page. Then click on \"Sign up Now\" on the right side of the page.     You will be asked to enter the access code listed below, as well as some personal information. Please follow the directions to create your username and password.     Your access code is: [email or web address]  Expires: 2017 11:18 AM     Your access code will  in 90 days. If you need help or a new code, please call your [facility] or [phone].        Care EveryWhere ID     This is your Care EveryWhere ID. This " "could be used by other organizations to access your [facility] medical records  [id number]        Your Vitals Were     Pulse Height BMI (Body Mass Index)             86 6' 3\" (1.905 m) 33.5 kg/m2          Blood Pressure from Last 3 Encounters:   01/02/17 136/73   08/23/16 117/77    Weight from Last 3 Encounters:   04/03/17 268 lb (121.6 kg)   03/27/17 268 lb (121.6 kg)   03/06/17 268 lb (121.6 kg)              We Performed the Following     DEBRIDEMENT WOUND UP TO 20 SQ CM        Primary Care Provider Office Phone # Fax #    Shannan Dennis [facility] [phone]       [address] [place] [address]        Thank you!     Thank you for choosing [facility]  for your care. Our goal is always to provide you with excellent care. Hearing back from our patients is one way we can continue to improve our services. Please take a few minutes to complete the written survey that you may receive in the mail after your visit with us. Thank you!             Your Updated Medication List - Protect others around you: Learn how to safely use, store and throw away your medicines at www.disposemymeds.org.          This list is accurate as of: 4/3/17 11:18 AM.  Always use your most recent med list.                   Brand Name Dispense Instructions for use    clindamycin 150 MG capsule    CLEOCIN    30 capsule    Take 1 capsule (150 mg) by mouth 3 times daily       ibuprofen 200 MG tablet    ADVIL/MOTRIN     Take 600 mg by mouth every 6 hours as needed for mild pain       insulin glargine 100 UNIT/ML injection    LANTUS     Inject 42 Units Subcutaneous At Bedtime       insulin pen needle 31G X 6 MM     100 each    Use 4 daily or as directed.       METFORMIN HCL PO      Take 1,000 mg by mouth 2 times daily (with meals)       order for DME     1 Device    Equipment being ordered: RollAbout x 3 months       silver sulfADIAZINE 1 % cream    SILVADENE     Apply topically daily Apply to L foot wound         "

## 2017-04-03 NOTE — NURSING NOTE
"Chief Complaint   Patient presents with     RECHECK     plantar food ulcer, questions on dressings, new blister on top of toe       Initial Pulse 86  Ht 6' 3\" (1.905 m)  Wt 268 lb (121.6 kg)  BMI 33.5 kg/m2 Estimated body mass index is 33.5 kg/(m^2) as calculated from the following:    Height as of this encounter: 6' 3\" (1.905 m).    Weight as of this encounter: 268 lb (121.6 kg).  Medication Reconciliation: complete  "

## 2017-04-24 ENCOUNTER — OFFICE VISIT (OUTPATIENT)
Dept: PODIATRY | Facility: CLINIC | Age: 57
End: 2017-04-24
Payer: COMMERCIAL

## 2017-04-24 ENCOUNTER — RADIANT APPOINTMENT (OUTPATIENT)
Dept: GENERAL RADIOLOGY | Facility: CLINIC | Age: 57
End: 2017-04-24
Attending: PODIATRIST
Payer: COMMERCIAL

## 2017-04-24 VITALS — HEIGHT: 75 IN | BODY MASS INDEX: 33.32 KG/M2 | WEIGHT: 268 LBS | HEART RATE: 88 BPM

## 2017-04-24 DIAGNOSIS — L97.522 ULCER OF LEFT FOOT, WITH FAT LAYER EXPOSED (H): Primary | ICD-10-CM

## 2017-04-24 DIAGNOSIS — M24.573 EQUINUS CONTRACTURE OF ANKLE: ICD-10-CM

## 2017-04-24 DIAGNOSIS — L97.522 ULCER OF LEFT FOOT, WITH FAT LAYER EXPOSED (H): ICD-10-CM

## 2017-04-24 PROCEDURE — 99213 OFFICE O/P EST LOW 20 MIN: CPT | Mod: 25 | Performed by: PODIATRIST

## 2017-04-24 PROCEDURE — 97597 DBRDMT OPN WND 1ST 20 CM/<: CPT | Performed by: PODIATRIST

## 2017-04-24 PROCEDURE — 73630 X-RAY EXAM OF FOOT: CPT | Mod: LT

## 2017-04-24 NOTE — MR AVS SNAPSHOT
"              After Visit Summary   4/24/2017    Riki Zepeda    MRN: [id number]           Patient Information     Date Of Birth          1960        Visit Information        Provider Department      4/24/2017 11:15 AM Deion Patterson DPM [facility]        Today's Diagnoses     Ulcer of left foot, with fat layer exposed (H)    -  1      Care Instructions      DR. PATTERSON'S CLINIC LOCATIONS:       MONDAY - [place] TUESDAY - [place]   [address] [facility] [facility] [address] [facility] [address]   [place] [address] [place], [place] [id number] [phone]       WEDNESDAY - SURGERY THURSDAY AM - [place]   [phone] [address]    [place], [place] [address] [phone]       THURSDAY PM - [place] FRIDAY AM - [place]   [address] [address] [place] [address] [place], [place] [id number] [phone]       APPT SCHEDULING: SEND FAXES TO:   [phone]     Follow Up: 2 wks or for surgery    FOOT & ANKLE SURGERY PLANNING CHECKLIST    If you have decided to have surgery, follow these 5 steps to get the procedure scheduled and to have the proper paperwork filled out. If you are unsure about surgery or would like to sit down and further discuss your issue and treatment options, please make an appointment.    1.  Pick the date that you would like to have surgery. Keep in mind that you will likely need at least 2 weeks off after the procedure for proper rest and healing.    2.  Call the surgery scheduling line at [phone] to get the procedure scheduled. Our  will also help you make your surgery consult appointment with me and your one week follow up after surgery for your first dressing change.    3.  If our surgery scheduler does not make your surgery consultation appointment with me then call to schedule that. When making the appointment, say \"I need to make a 30 minute surgical consult appointment\". It is recommended that you " "bring a spouse, family member or friend with you. There will be lots of information presented. It can be overwhelming and it is better to have someone there to help sort out the details.    4.  Make an appointment to see your Primary Care Physician within 4 weeks of the date of surgery for your \"Pre-Operative History and Physical\" This is done to make sure you are medically healthy enough to undergo surgery.    5. Contact your employer to request time off from work if needed.    If you have any post-operative questions regarding your procedure, call our triage nurses at [phone].              BODY MASS INDEX (BMI)  Many things can cause foot and ankle problems. Foot structure, activity level, foot mechanics and injuries are common causes of pain.    One very important issue that often goes unmentioned, is body weight.  Extra weight can cause increased stress on muscles, ligaments, bones and tendons. Sometimes just a few extra pounds is all it takes to put one over her/his threshold. Without reducing that stress, it can be difficult to alleviate pain.      Some people are uncomfortable addressing this issue, but we feel it is important for you to think about it. As Foot & Ankle specialists, our job is addressing the lower extremity problem and possible causes.     Regarding extra body weight, we encourage patients to discuss diet and weight management plans with their primary care doctors. It is this team approach that gives you the best opportunity for pain relief and getting you back on your feet.                Follow-ups after your visit        Future tests that were ordered for you today     Open Future Orders        Priority Expected Expires Ordered    XR Foot Left G/E 3 Views Routine 4/24/2017 4/24/2018 4/24/2017            Who to contact     If you have questions or need follow up information about today's clinic visit or your schedule please contact [facility] [place] directly at " "[phone].  Normal or non-critical lab and imaging results will be communicated to you by MyChart, letter or phone within 4 business days after the clinic has received the results. If you do not hear from us within 7 days, please contact the clinic through [employer] or phone. If you have a critical or abnormal lab result, we will notify you by phone as soon as possible.  Submit refill requests through [employer] or call your pharmacy and they will forward the refill request to us. Please allow 3 business days for your refill to be completed.          Additional Information About Your Visit        [employer] Information     [employer] lets you send messages to your doctor, view your test results, renew your prescriptions, schedule appointments and more. To sign up, go to [email or web address] . Click on \"Log in\" on the left side of the screen, which will take you to the Welcome page. Then click on \"Sign up Now\" on the right side of the page.     You will be asked to enter the access code listed below, as well as some personal information. Please follow the directions to create your username and password.     Your access code is: [email or web address]  Expires: 2017 11:18 AM     Your access code will  in 90 days. If you need help or a new code, please call your [facility] clinic or [phone].        Care EveryWhere ID     This is your Care EveryWhere ID. This could be used by other organizations to access your [facility] medical records  [id number]        Your Vitals Were     Pulse Height BMI (Body Mass Index)             88 6' 3\" (1.905 m) 33.5 kg/m2          Blood Pressure from Last 3 Encounters:   17 136/73   16 117/77    Weight from Last 3 Encounters:   17 268 lb (121.6 kg)   17 268 lb (121.6 kg)   17 268 lb (121.6 kg)               Primary Care Provider Office Phone # Fax #    Shnanan Jeannie [facility] [phone]       [address] [place] [address]        Thank you!  "    Thank you for choosing [facility] [place]  for your care. Our goal is always to provide you with excellent care. Hearing back from our patients is one way we can continue to improve our services. Please take a few minutes to complete the written survey that you may receive in the mail after your visit with us. Thank you!             Your Updated Medication List - Protect others around you: Learn how to safely use, store and throw away your medicines at www.disposemymeds.org.          This list is accurate as of: 4/24/17 11:33 AM.  Always use your most recent med list.                   Brand Name Dispense Instructions for use    clindamycin 150 MG capsule    CLEOCIN    30 capsule    Take 1 capsule (150 mg) by mouth 3 times daily       ibuprofen 200 MG tablet    ADVIL/MOTRIN     Take 600 mg by mouth every 6 hours as needed for mild pain       insulin glargine 100 UNIT/ML injection    LANTUS     Inject 42 Units Subcutaneous At Bedtime       insulin pen needle 31G X 6 MM     100 each    Use 4 daily or as directed.       METFORMIN HCL PO      Take 1,000 mg by mouth 2 times daily (with meals)       order for DME     1 Device    Equipment being ordered: RollAbout x 3 months       silver sulfADIAZINE 1 % cream    SILVADENE     Apply topically daily Apply to L foot wound

## 2017-04-24 NOTE — NURSING NOTE
"Chief Complaint   Patient presents with     RECHECK     Left foot ulcer check, still draining       Initial Pulse 88  Ht 6' 3\" (1.905 m)  Wt 268 lb (121.6 kg)  BMI 33.5 kg/m2 Estimated body mass index is 33.5 kg/(m^2) as calculated from the following:    Height as of this encounter: 6' 3\" (1.905 m).    Weight as of this encounter: 268 lb (121.6 kg).  Medication Reconciliation: complete  "

## 2017-04-24 NOTE — PROGRESS NOTES
"Foot & Ankle Surgery   2017    S:  Pt is seen today for evaluation of ulcer plantar L ulcer.  Daily dressing change, aquacel Ag and has mostly been NWB, but admits to walking on it a bit.  .    Vitals:    17 1115   Pulse: 88   Weight: 268 lb (121.6 kg)   Height: 6' 3\" (1.905 m)   '      ROS - Pos for CC.  Patient denies current nausea, vomiting, chills, fevers, belly pain, calf pain, chest pain or SOB.  Complete remainder of ROS it otherwise neg.      PE:  Gen:   No apparent distress  Neuro:   A&Ox3, no deficits  Psych:    Answering questions appropriately for age and situation with normal affect  Head:    NCAT  Eye:    Visual scanning without deficit  Ear:    Response to auditory stimuli wnl  Lung:    Non-labored breathing on RA noted  Abd:    NTND per patient report  Lymph: Neg for pitting/non-pitting edema BLE  Vasc: Pulses palpable, CFT minimally delayed  Neuro: Light touch sensation greatly diminished distally left lower extremity   Derm: Ulcer plantar L midfoot, partial thickness, 11 x 4mm, granular base after debridement. Slight erythema along medial arch noted. No deep probing, partial thickness wound.   MSK: Baseline charcot changes L foot, no acute instability.  Calf: Neg for redness, swelling or tenderness      Imagin views WB - pes planus, talar neck fracture and destruction of navicular.  Negative calcaneal pitch angle.        Assessment:  57 year old male with chronic ulcer plantar L foot; Charcot midfoot collapse      Plan:  Discussed etiologies/options  1.  Ulcer L foot  -Excisional debridement was performed to the wound, partial-thickness(limited to skin breakdown), sharply debridement the wound, excising nonviable tissue to the above dimensions with a tissue nipper  -continue daily dressing changes, aquacel Ag, NWB on RollAbout    2.  Charcot L foot  -discussed long-term offloading, including CROW walker  -surgically, discussed excision of ulcer, planing of bone, " closure  -briefly discussed post-op course, including off work/NWB until healed       Follow up:  2 weeks or sooner for surgery consult    Consent:  Excision of ulcer with bone resection left foot with possible tendoAchilles lengthening    Procedure(s):  1.  above    Diagnosis:  Nonhealing ulcer; charcot foot    Equipment:  Sagittal saw; podiatry tray  Orthobiologics:  Arthrex ACP(or other PRP system available)    Position:  supine    Tourniquet:   thigh    Mini-C:  yes    Anesthesia:  GETA with popliteal block    Allergies:  No Known Allergies    Antibiotics:  3g Ancef    Procedure time:  60m    Dispo:  Same day    Nieves catheter:  no    Crutch/walker training and fitting:  no     OR Clot prevention:  SCD right lower extremity     Surgery location:  [facility]        Body mass index is 33.5 kg/(m^2).  Weight management plan: Patient was referred to their PCP to discuss a diet and exercise plan.         Deion Pitts DPM   Podiatric Foot & Ankle Surgeon  [facility]  [phone]

## 2017-04-24 NOTE — PATIENT INSTRUCTIONS
"  DR. PATTERSON'S CLINIC LOCATIONS:       MONDAY - [place]  TUESDAY - [place]   [address] [facility] [address] [facility] [address]   [place] [address] [place] [address]   [phone]       WEDNESDAY - SURGERY THURSDAY AM - [place]   [phone] [address]    [place] [address]    [phone]       THURSDAY PM - [place] FRIDAY AM - [place]   [address] [facility] [address] [address] [place] [address] [place] [address]   [phone]       APPT SCHEDULING: SEND FAXES TO:   [phone]     Follow Up: 2 wks or for surgery    FOOT & ANKLE SURGERY PLANNING CHECKLIST    If you have decided to have surgery, follow these 5 steps to get the procedure scheduled and to have the proper paperwork filled out. If you are unsure about surgery or would like to sit down and further discuss your issue and treatment options, please make an appointment.    1.  Pick the date that you would like to have surgery. Keep in mind that you will likely need at least 2 weeks off after the procedure for proper rest and healing.    2.  Call the surgery scheduling line at [phone] to get the procedure scheduled. Our  will also help you make your surgery consult appointment with me and your one week follow up after surgery for your first dressing change.    3.  If our surgery scheduler does not make your surgery consultation appointment with me then call to schedule that. When making the appointment, say \"I need to make a 30 minute surgical consult appointment\". It is recommended that you bring a spouse, family member or friend with you. There will be lots of information presented. It can be overwhelming and it is better to have someone there to help sort out the details.    4.  Make an appointment to see your Primary Care Physician within 4 weeks of the date of surgery for your \"Pre-Operative History and Physical\" This is done to make sure you are medically healthy " enough to undergo surgery.    5. Contact your employer to request time off from work if needed.    If you have any post-operative questions regarding your procedure, call our triage nurses at [phone].              BODY MASS INDEX (BMI)  Many things can cause foot and ankle problems. Foot structure, activity level, foot mechanics and injuries are common causes of pain.    One very important issue that often goes unmentioned, is body weight.  Extra weight can cause increased stress on muscles, ligaments, bones and tendons. Sometimes just a few extra pounds is all it takes to put one over her/his threshold. Without reducing that stress, it can be difficult to alleviate pain.      Some people are uncomfortable addressing this issue, but we feel it is important for you to think about it. As Foot & Ankle specialists, our job is addressing the lower extremity problem and possible causes.     Regarding extra body weight, we encourage patients to discuss diet and weight management plans with their primary care doctors. It is this team approach that gives you the best opportunity for pain relief and getting you back on your feet.

## 2017-05-15 ENCOUNTER — OFFICE VISIT (OUTPATIENT)
Dept: PODIATRY | Facility: CLINIC | Age: 57
End: 2017-05-15
Payer: COMMERCIAL

## 2017-05-15 VITALS — HEART RATE: 86 BPM | HEIGHT: 75 IN | BODY MASS INDEX: 33.32 KG/M2 | WEIGHT: 268 LBS | RESPIRATION RATE: 16 BRPM

## 2017-05-15 DIAGNOSIS — L97.521 ULCER OF FOOT, LEFT, LIMITED TO BREAKDOWN OF SKIN (H): Primary | ICD-10-CM

## 2017-05-15 PROCEDURE — 97597 DBRDMT OPN WND 1ST 20 CM/<: CPT | Performed by: PODIATRIST

## 2017-05-15 PROCEDURE — 99213 OFFICE O/P EST LOW 20 MIN: CPT | Mod: 25 | Performed by: PODIATRIST

## 2017-05-15 RX ORDER — SILVER SULFADIAZINE 10 MG/G
CREAM TOPICAL
Qty: 85 G | Refills: 1 | Status: SHIPPED | OUTPATIENT
Start: 2017-05-15

## 2017-05-15 NOTE — PATIENT INSTRUCTIONS
"  DR. PATTERSON'S CLINIC LOCATIONS:       MONDAY - [place]  TUESDAY - [place]   [address] [facility] [address] [facility] [address]   [place] [address] [place] [address]   [phone]       WEDNESDAY - SURGERY THURSDAY AM - [place]   [phone] [address]    [place] [address]    [phone]       THURSDAY PM - [place] FRIDAY AM - [place]   [address] [facility] [address] [address] [place] [address] [place] [address]   [phone]       APPT SCHEDULING: SEND FAXES TO:   [phone]     Follow Up: 3 wks    FOOT & ANKLE SURGERY PLANNING CHECKLIST    If you have decided to have surgery, follow these 5 steps to get the procedure scheduled and to have the proper paperwork filled out. If you are unsure about surgery or would like to sit down and further discuss your issue and treatment options, please make an appointment.    1.  Pick the date that you would like to have surgery. Keep in mind that you will likely need at least 2 weeks off after the procedure for proper rest and healing.    2.  Call the surgery scheduling line at [phone] to get the procedure scheduled. Our  will also help you make your surgery consult appointment with me and your one week follow up after surgery for your first dressing change.    3.  If our surgery scheduler does not make your surgery consultation appointment with me then call to schedule that. When making the appointment, say \"I need to make a 30 minute surgical consult appointment\". It is recommended that you bring a spouse, family member or friend with you. There will be lots of information presented. It can be overwhelming and it is better to have someone there to help sort out the details.    4.  Make an appointment to see your Primary Care Physician within 4 weeks of the date of surgery for your \"Pre-Operative History and Physical\" This is done to make sure you are medically healthy enough to undergo " surgery.    5. Contact your employer to request time off from work if needed.    If you have any post-operative questions regarding your procedure, call our triage nurses at [phone].              BODY MASS INDEX (BMI)  Many things can cause foot and ankle problems. Foot structure, activity level, foot mechanics and injuries are common causes of pain.    One very important issue that often goes unmentioned, is body weight.  Extra weight can cause increased stress on muscles, ligaments, bones and tendons. Sometimes just a few extra pounds is all it takes to put one over her/his threshold. Without reducing that stress, it can be difficult to alleviate pain.      Some people are uncomfortable addressing this issue, but we feel it is important for you to think about it. As Foot & Ankle specialists, our job is addressing the lower extremity problem and possible causes.     Regarding extra body weight, we encourage patients to discuss diet and weight management plans with their primary care doctors. It is this team approach that gives you the best opportunity for pain relief and getting you back on your feet.

## 2017-05-15 NOTE — PROGRESS NOTES
"Foot & Ankle Surgery   May 15, 2017    S:  Pt is seen today for evaluation of L foot Charcot foot with ulcer.  Offloading with RollAbout, routine wound debridement.  The wound is smaller but has not healed with conservative therapies.    Vitals:    05/15/17 1113   Pulse: 86   Resp: 16   Weight: 268 lb (121.6 kg)   Height: 6' 3\" (1.905 m)   '      ROS - Pos for CC.  Patient denies current nausea, vomiting, chills, fevers, belly pain, calf pain, chest pain or SOB.  Complete remainder of ROS it otherwise neg.      PE:  Gen:   No apparent distress  Neuro:   A&Ox3, no deficits  Psych:    Answering questions appropriately for age and situation with normal affect  Head:    NCAT  Eye:    Visual scanning without deficit  Ear:    Response to auditory stimuli wnl  Lung:    Non-labored breathing on RA noted  Abd:    NTND per patient report  Lymph: Neg for pitting/non-pitting edema BLE  Vasc: Pulses palpable, CFT minimally delayed  Neuro: Light touch sensation greatly diminished distally left lower extremity   Derm: Ulcer plantar L midfoot, partial thickness, 9 x 6mm, granular base after debridement. No SOI  MSK: Baseline charcot changes L foot, no acute instability.  Calf: Neg for redness, swelling or tenderness        Imagin views WB - pes planus, talar neck fracture and destruction of navicular. Negative calcaneal pitch angle.         Assessment:  57 year old male with chronic ulcer plantar L foot; Charcot midfoot collapse      Plan:  Discussed etiologies/options  1.  Ulcer L foot  -Excisional debridement was performed to the wound, partial-thickness(limited to skin breakdown), sharply debridement the wound, excising nonviable tissue to the above dimensions with a tissue nipper  -offloading  -daily cares - wash/dry, abx ointment/bandaid    2.  Charcot with equinus  -offloading with CROW vs orthotics  -surgically, discussed Achilles lengthening and bump resection vs lengthening with reconstruction.   -they will consider " their options.  surg [facility] handout again dispensed     Follow up:  3 weeks    Billing today is based on time > 15 minutes, more than 50% spent counseling and coordinating cares, excluding procedure time        Body mass index is 33.5 kg/(m^2).  Weight management plan: Patient was referred to their PCP to discuss a diet and exercise plan.         Deion Pitts DPM   Podiatric Foot & Ankle Surgeon  [facility]  [phone]

## 2017-05-15 NOTE — NURSING NOTE
"Chief Complaint   Patient presents with     RECHECK     L foot ulcer check, not considering surgery unless completely needed       Initial Pulse 86  Resp 16  Ht 6' 3\" (1.905 m)  Wt 268 lb (121.6 kg)  BMI 33.5 kg/m2 Estimated body mass index is 33.5 kg/(m^2) as calculated from the following:    Height as of this encounter: 6' 3\" (1.905 m).    Weight as of this encounter: 268 lb (121.6 kg).  Medication Reconciliation: complete  "

## 2017-05-15 NOTE — MR AVS SNAPSHOT
"              After Visit Summary   5/15/2017    Riki Zepeda    MRN: [id number]           Patient Information     Date Of Birth          1960        Visit Information        Provider Department      5/15/2017 11:00 AM Deion Patterson DPM [facility] [place]        Care Instructions      DR. PATTERSON'S CLINIC LOCATIONS:       MONDAY - [place]  TUESDAY - [place]   [address]   [place] [address] [place], [place] [id number] [phone]       WEDNESDAY - SURGERY THURSDAY AM - [place]   [phone] [address]    [place], [place] [address] [phone]       THURSDAY PM - [place] FRIDAY AM - [place]   [address] [facility] [address] [place] [address] [place] [address]   [phone]       APPT SCHEDULING: SEND FAXES TO:   [phone]     Follow Up: 3 wks    FOOT & ANKLE SURGERY PLANNING CHECKLIST    If you have decided to have surgery, follow these 5 steps to get the procedure scheduled and to have the proper paperwork filled out. If you are unsure about surgery or would like to sit down and further discuss your issue and treatment options, please make an appointment.    1.  Pick the date that you would like to have surgery. Keep in mind that you will likely need at least 2 weeks off after the procedure for proper rest and healing.    2.  Call the surgery scheduling line at [phone] to get the procedure scheduled. Our  will also help you make your surgery consult appointment with me and your one week follow up after surgery for your first dressing change.    3.  If our surgery scheduler does not make your surgery consultation appointment with me then call to schedule that. When making the appointment, say \"I need to make a 30 minute surgical consult appointment\". It is recommended that you bring a spouse, family member or friend with you. There will be lots of information presented. It " "can be overwhelming and it is better to have someone there to help sort out the details.    4.  Make an appointment to see your Primary Care Physician within 4 weeks of the date of surgery for your \"Pre-Operative History and Physical\" This is done to make sure you are medically healthy enough to undergo surgery.    5. Contact your employer to request time off from work if needed.    If you have any post-operative questions regarding your procedure, call our triage nurses at [phone].              BODY MASS INDEX (BMI)  Many things can cause foot and ankle problems. Foot structure, activity level, foot mechanics and injuries are common causes of pain.    One very important issue that often goes unmentioned, is body weight.  Extra weight can cause increased stress on muscles, ligaments, bones and tendons. Sometimes just a few extra pounds is all it takes to put one over her/his threshold. Without reducing that stress, it can be difficult to alleviate pain.      Some people are uncomfortable addressing this issue, but we feel it is important for you to think about it. As Foot & Ankle specialists, our job is addressing the lower extremity problem and possible causes.     Regarding extra body weight, we encourage patients to discuss diet and weight management plans with their primary care doctors. It is this team approach that gives you the best opportunity for pain relief and getting you back on your feet.                Follow-ups after your visit        Who to contact     If you have questions or need follow up information about today's clinic visit or your schedule please contact [facility] directly at [phone].  Normal or non-critical lab and imaging results will be communicated to you by MyChart, letter or phone within 4 business days after the clinic has received the results. If you do not hear from us within 7 days, please contact the clinic through MyChart or phone. If you have a critical or " "abnormal lab result, we will notify you by phone as soon as possible.  Submit refill requests through [employer] or call your pharmacy and they will forward the refill request to us. Please allow 3 business days for your refill to be completed.          Additional Information About Your Visit        CounterStorm[employer] Information     [employer] lets you send messages to your doctor, view your test results, renew your prescriptions, schedule appointments and more. To sign up, go to [email or web address] . Click on \"Log in\" on the left side of the screen, which will take you to the Welcome page. Then click on \"Sign up Now\" on the right side of the page.     You will be asked to enter the access code listed below, as well as some personal information. Please follow the directions to create your username and password.     Your access code is: [email or web address]  Expires: 2017 11:18 AM     Your access code will  in 90 days. If you need help or a new code, please call your [facility] clinic or [phone].        Care EveryWhere ID     This is your Care EveryWhere ID. This could be used by other organizations to access your [facility] medical records  [id number]        Your Vitals Were     Pulse Respirations Height BMI (Body Mass Index)          86 16 6' 3\" (1.905 m) 33.5 kg/m2         Blood Pressure from Last 3 Encounters:   17 136/73   16 117/77    Weight from Last 3 Encounters:   05/15/17 268 lb (121.6 kg)   17 268 lb (121.6 kg)   17 268 lb (121.6 kg)              Today, you had the following     No orders found for display       Primary Care Provider Office Phone # Fax #    Shannan [facility] [phone]       [facility] [address]        Thank you!     Thank you for choosing [facility]  for your care. Our goal is always to provide you with excellent care. Hearing back from our patients is one way we can continue to improve our services. Please take a few " minutes to complete the written survey that you may receive in the mail after your visit with us. Thank you!             Your Updated Medication List - Protect others around you: Learn how to safely use, store and throw away your medicines at www.disposemymeds.org.          This list is accurate as of: 5/15/17 11:30 AM.  Always use your most recent med list.                   Brand Name Dispense Instructions for use    clindamycin 150 MG capsule    CLEOCIN    30 capsule    Take 1 capsule (150 mg) by mouth 3 times daily       ibuprofen 200 MG tablet    ADVIL/MOTRIN     Take 600 mg by mouth every 6 hours as needed for mild pain       insulin glargine 100 UNIT/ML injection    LANTUS     Inject 42 Units Subcutaneous At Bedtime       insulin pen needle 31G X 6 MM     100 each    Use 4 daily or as directed.       METFORMIN HCL PO      Take 1,000 mg by mouth 2 times daily (with meals)       order for DME     1 Device    Equipment being ordered: RollAbout x 3 months       silver sulfADIAZINE 1 % cream    SILVADENE     Apply topically daily Apply to L foot wound

## 2017-05-15 NOTE — LETTER
"  5/15/2017       RE: Riki Zepeda  [address]   [place] [facility] [place] [address]           Dear Colleague,    Thank you for referring your patient, Riki Zepeda, to the [facility]. Please see a copy of my visit note below.    Foot & Ankle Surgery   May 15, 2017    S:  Pt is seen today for evaluation of ***.    Vitals:    05/15/17 1113   Pulse: 86   Resp: 16   Weight: 268 lb (121.6 kg)   Height: 6' 3\" (1.905 m)   '      ROS - Pos for CC.  Patient denies current nausea, vomiting, chills, fevers, belly pain, calf pain, chest pain or SOB.  Complete remainder of ROS it otherwise neg.  ***    PE:  Gen:   No apparent distress  Neuro:   A&Ox3, no deficits  Psych:    Answering questions appropriately for age and situation with normal affect  Head:    NCAT  Eye:    Visual scanning without deficit  Ear:    Response to auditory stimuli wnl  Lung:    Non-labored breathing on RA noted  Abd:    NTND per patient report  Lymph: Neg for pitting/non-pitting edema BLE  Vasc: Pulses palpable, CFT minimally delayed  Neuro: Light touch sensation greatly diminished distally left lower extremity   Derm: Ulcer plantar L midfoot, partial thickness, 9 x 6mm, granular base after debridement. No SOI  MSK: Baseline charcot changes L foot, no acute instability.  Calf: Neg for redness, swelling or tenderness        Imagin views WB - pes planus, talar neck fracture and destruction of navicular. Negative calcaneal pitch angle.         Assessment:  57 year old male with chronic ulcer plantar L foot; Charcot midfoot collapse      Plan:  Discussed etiologies/options  1.  Ulcer L foot  -Excisional debridement was performed to the wound, partial-thickness(limited to skin breakdown), sharply debridement the wound, excising nonviable tissue to the above dimensions with a tissue nipper  -offloading  -daily cares - wash/dry, abx ointment/bandaid    2.  Charcot with equinus  -offloading with CROW vs orthotics  -surgically, discussed " Achilles lengthening and bump resection vs lengthening with reconstruction.   -they will consider their options.  surg harry handout again dispensed     Follow up:  3 weeks    Billing today is based on time > 15 minutes, more than 50% spent counseling and coordinating cares, excluding procedure time        Body mass index is 33.5 kg/(m^2).  Weight management plan: Patient was referred to their PCP to discuss a diet and exercise plan.         Deion Pitts DPM   Podiatric Foot & Ankle Surgeon  [facility]  [phone]    Again, thank you for allowing me to participate in the care of your patient.        Sincerely,              Deion Pitts DPM, DPM

## 2017-05-30 ENCOUNTER — OFFICE VISIT (OUTPATIENT)
Dept: PODIATRY | Facility: CLINIC | Age: 57
End: 2017-05-30
Payer: COMMERCIAL

## 2017-05-30 VITALS
WEIGHT: 268 LBS | HEIGHT: 75 IN | BODY MASS INDEX: 33.32 KG/M2 | SYSTOLIC BLOOD PRESSURE: 124 MMHG | DIASTOLIC BLOOD PRESSURE: 82 MMHG

## 2017-05-30 DIAGNOSIS — L97.502 DIABETIC ULCER OF FOOT WITH FAT LAYER EXPOSED (H): Primary | ICD-10-CM

## 2017-05-30 DIAGNOSIS — E11.610 CHARCOT FOOT DUE TO DIABETES MELLITUS (H): ICD-10-CM

## 2017-05-30 DIAGNOSIS — E11.621 DIABETIC ULCER OF FOOT WITH FAT LAYER EXPOSED (H): Primary | ICD-10-CM

## 2017-05-30 PROCEDURE — 99212 OFFICE O/P EST SF 10 MIN: CPT | Mod: 25 | Performed by: PODIATRIST

## 2017-05-30 PROCEDURE — 11042 DBRDMT SUBQ TIS 1ST 20SQCM/<: CPT | Performed by: PODIATRIST

## 2017-05-30 NOTE — PATIENT INSTRUCTIONS
DR. PATTERSON'S CLINIC LOCATIONS:       MONDAY - [place]  TUESDAY - [place]   [address] [facility] [address] [facility] [address]   [place] [address] [place], [place] [id number] [phone]       WEDNESDAY - SURGERY THURSDAY AM - [place]   [phone] [address]    [place], [place] [address] [phone]       THURSDAY PM - [place] FRIDAY AM - [place]   [address] [facility] [address] [place] [address] [place] [address]   [phone]       APPT SCHEDULING: SEND FAXES TO:   [phone]     Follow Up: 2 wks    BODY MASS INDEX (BMI)  Many things can cause foot and ankle problems. Foot structure, activity level, foot mechanics and injuries are common causes of pain.    One very important issue that often goes unmentioned, is body weight.  Extra weight can cause increased stress on muscles, ligaments, bones and tendons. Sometimes just a few extra pounds is all it takes to put one over her/his threshold. Without reducing that stress, it can be difficult to alleviate pain.      Some people are uncomfortable addressing this issue, but we feel it is important for you to think about it. As Foot & Ankle specialists, our job is addressing the lower extremity problem and possible causes.     Regarding extra body weight, we encourage patients to discuss diet and weight management plans with their primary care doctors. It is this team approach that gives you the best opportunity for pain relief and getting you back on your feet.

## 2017-05-30 NOTE — NURSING NOTE
"Chief Complaint   Patient presents with     RECHECK     Left plantar foot wound f/u       Initial /82  Ht 6' 3\" (1.905 m)  Wt 268 lb (121.6 kg)  BMI 33.5 kg/m2 Estimated body mass index is 33.5 kg/(m^2) as calculated from the following:    Height as of this encounter: 6' 3\" (1.905 m).    Weight as of this encounter: 268 lb (121.6 kg).  Medication Reconciliation: complete  "

## 2017-05-30 NOTE — MR AVS SNAPSHOT
After Visit Summary   5/30/2017    Riki Zepeda    MRN: [id number]           Patient Information     Date Of Birth          1960        Visit Information        Provider Department      5/30/2017 1:15 PM Deion Patterson DPM [facility]        Care Instructions      DR. PATTERSON'S CLINIC LOCATIONS:       MONDAY - [place] TUESDAY - [place]   [address] [facility] [address] [facility] [address]   [place] [address] [place] [address]   [phone]       WEDNESDAY - SURGERY THURSDAY AM - [place]   [phone] [address]    [place], [place] [address] [phone]       THURSDAY PM - [place] FRIDAY AM - [place]   [address] [facility] [address] [place] [address] [place] [address]   [phone]       APPT SCHEDULING: SEND FAXES TO:   [phone]     Follow Up: 2 wks    BODY MASS INDEX (BMI)  Many things can cause foot and ankle problems. Foot structure, activity level, foot mechanics and injuries are common causes of pain.    One very important issue that often goes unmentioned, is body weight.  Extra weight can cause increased stress on muscles, ligaments, bones and tendons. Sometimes just a few extra pounds is all it takes to put one over her/his threshold. Without reducing that stress, it can be difficult to alleviate pain.      Some people are uncomfortable addressing this issue, but we feel it is important for you to think about it. As Foot & Ankle specialists, our job is addressing the lower extremity problem and possible causes.     Regarding extra body weight, we encourage patients to discuss diet and weight management plans with their primary care doctors. It is this team approach that gives you the best opportunity for pain relief and getting you back on your feet.                  Follow-ups after your visit        Who to contact     If you have questions or need follow up information about today's  "clinic visit or your schedule please contact [facility] directly at [phone].  Normal or non-critical lab and imaging results will be communicated to you by MyChart, letter or phone within 4 business days after the clinic has received the results. If you do not hear from us within 7 days, please contact the clinic through [employer] or phone. If you have a critical or abnormal lab result, we will notify you by phone as soon as possible.  Submit refill requests through [employer] or call your pharmacy and they will forward the refill request to us. Please allow 3 business days for your refill to be completed.          Additional Information About Your Visit        Live[employer] Information     [employer] lets you send messages to your doctor, view your test results, renew your prescriptions, schedule appointments and more. To sign up, go to [email or web address] . Click on \"Log in\" on the left side of the screen, which will take you to the Welcome page. Then click on \"Sign up Now\" on the right side of the page.     You will be asked to enter the access code listed below, as well as some personal information. Please follow the directions to create your username and password.     Your access code is: [email or web address]  Expires: 2017 11:18 AM     Your access code will  in 90 days. If you need help or a new code, please call your [place] clinic or [phone].        Care EveryWhere ID     This is your Care EveryWhere ID. This could be used by other organizations to access your [facility] medical records  [id number]        Your Vitals Were     Height BMI (Body Mass Index)                6' 3\" (1.905 m) 33.5 kg/m2           Blood Pressure from Last 3 Encounters:   17 124/82   17 136/73   16 117/77    Weight from Last 3 Encounters:   17 268 lb (121.6 kg)   05/15/17 268 lb (121.6 kg)   17 268 lb (121.6 kg)              Today, you had the following     No orders found for display    "      Today's Medication Changes          These changes are accurate as of: 5/30/17  1:40 PM.  If you have any questions, ask your nurse or doctor.               Stop taking these medicines if you haven't already. Please contact your care team if you have questions.     clindamycin 150 MG capsule   Commonly known as:  CLEOCIN   Stopped by:  Deion Pitts DPM                    Primary Care Provider Office Phone # Fax #    Shannan [facility] [phone]       [facility] [address]        Thank you!     Thank you for choosing [facility]  for your care. Our goal is always to provide you with excellent care. Hearing back from our patients is one way we can continue to improve our services. Please take a few minutes to complete the written survey that you may receive in the mail after your visit with us. Thank you!             Your Updated Medication List - Protect others around you: Learn how to safely use, store and throw away your medicines at www.disposemymeds.org.          This list is accurate as of: 5/30/17  1:40 PM.  Always use your most recent med list.                   Brand Name Dispense Instructions for use    ibuprofen 200 MG tablet    ADVIL/MOTRIN     Take 600 mg by mouth every 6 hours as needed for mild pain       insulin glargine 100 UNIT/ML injection    LANTUS     Inject 42 Units Subcutaneous At Bedtime       insulin pen needle 31G X 6 MM     100 each    Use 4 daily or as directed.       METFORMIN HCL PO      Take 1,000 mg by mouth 2 times daily (with meals)       order for DME     1 Device    Equipment being ordered: RollAbout x 3 months       * silver sulfADIAZINE 1 % cream    SILVADENE     Apply topically daily Apply to L foot wound       * silver sulfADIAZINE 1 % cream    SILVADENE    85 g    Apply to wound daily       * Notice:  This list has 2 medication(s) that are the same as other medications prescribed for you. Read the directions carefully,  and ask your doctor or other care provider to review them with you.

## 2017-05-30 NOTE — PROGRESS NOTES
"Foot & Ankle Surgery   May 30, 2017    S:  Pt is seen today for evaluation of ulcer plantar L midfoot.  Trying to work on the callus and stay off the foot.  He'd like to return to work for a few hours per day.    Vitals:    05/30/17 1320   BP: 124/82   Weight: 268 lb (121.6 kg)   Height: 6' 3\" (1.905 m)   '      ROS - Pos for CC.  Patient denies current nausea, vomiting, chills, fevers, belly pain, calf pain, chest pain or SOB.  Complete remainder of ROS it otherwise neg.      PE:  Gen:   No apparent distress  Neuro:   A&Ox3, no deficits  Psych:    Answering questions appropriately for age and situation with normal affect  Head:    NCAT  Eye:    Visual scanning without deficit  Ear:    Response to auditory stimuli wnl  Lung:    Non-labored breathing on RA noted  Abd:    NTND per patient report  Lymph:    Neg for pitting/non-pitting edema BLE  Vasc:    Pulses palpable, CFT minimally delayed  Neuro:    Light touch sensation intact to all sensory nerve distributions without paresthesias  Derm:    Ulcer is now full-thickness, 1.0 x 0.6cm, granular base after debridement, no SOI  MSK:    ROM, strength wnl without limitation, no pain on palpation noted.  Calf:    Neg for redness, swelling or tenderness    Assessment:  57 year old male with non-healing plantar L midfoot wound; charcot      Plan:  Discussed etiologies/options  1.  Ulcer left foot  -Excisional debridement was performed to the wound, full-thickness, sharply debridement the wound down to and including exposed sub-cutaneous tissue/fat, excising nonviable tissue to the above dimensions with a tissue nipper  -daily cares - wash/dry, bandage; NWB  -note to return to work 2 hours x 1 week, then 4 hours daily x 1 week    2.  charcot  -we again discussed lengthening, debridement, and post-op course      Follow up:  2 weeks    Billing today is based on time > 10 minutes, more than 50% spent counseling and coordinating cares, excluding procedure time       Body mass " index is 33.5 kg/(m^2).  Weight management plan: Patient was referred to their PCP to discuss a diet and exercise plan.         Deion Pitts DPM   Podiatric Foot & Ankle Surgeon  [facility]  [phone]

## 2017-05-30 NOTE — LETTER
May 30, 2017      Riki Zepeda  [id number]   [place] [address]      To whom it may concern:    Riki Zepeda is allowed to return to work under the following schedule starting 6/1/17:    Return to work 2 hours per day x 1 week; then    Return to work 4 hours per day x 1 week.    He will then return to clinic for a re-evaluation, and we will proceed forward based on the above plan.  He is able to drive, but no bearing weight on the left foot.      Thank you        Deion Pitts, REYM   Podiatric Foot & Ankle Surgeon  [facility]

## 2017-06-12 ENCOUNTER — OFFICE VISIT (OUTPATIENT)
Dept: PODIATRY | Facility: CLINIC | Age: 57
End: 2017-06-12
Payer: COMMERCIAL

## 2017-06-12 VITALS — HEIGHT: 75 IN | HEART RATE: 82 BPM

## 2017-06-12 DIAGNOSIS — L97.522 ULCER OF FOOT, LEFT, WITH FAT LAYER EXPOSED (H): Primary | ICD-10-CM

## 2017-06-12 PROCEDURE — 11042 DBRDMT SUBQ TIS 1ST 20SQCM/<: CPT | Performed by: PODIATRIST

## 2017-06-12 PROCEDURE — 99212 OFFICE O/P EST SF 10 MIN: CPT | Mod: 25 | Performed by: PODIATRIST

## 2017-06-12 NOTE — MR AVS SNAPSHOT
After Visit Summary   6/12/2017    Riki Zepeda    MRN: [id number]           Patient Information     Date Of Birth          1960        Visit Information        Provider Department      6/12/2017 11:15 AM Deion Patterson DPM [facility]        Care Instructions      DR. PATTERSON'S CLINIC LOCATIONS:   MONDAY - [place] TUESDAY - [place]   [address] [facility] [address] [address]   [place] [address] [place] [address]   [phone]       THURSDAY AM - [place] THURSDAY PM - [place]   [address]   [place] [address] [place], [place] [address] [phone]       FRIDAY AM - [place] SCHEDULE SURGERY: [phone]   [address] APPOINTMENTS: [phone]   [place] [address] AFTER HOURS: [phone] FAX NUMBER: [phone]     Follow Up:     Body Mass Index (BMI)  Many things can cause foot and ankle problems. Foot structure, activity level, foot mechanics and injuries are common causes of pain.  One very important issue that often goes unmentioned, is body weight.  Extra weight can cause increased stress on muscles, ligaments, bones and tendons.  Sometimes just a few extra pounds is all it takes to put one over her/his threshold. Without reducing that stress, it can be difficult to alleviate pain. Some people are uncomfortable addressing this issue, but we feel it is important for you to think about it. As Foot &  Ankle specialists, our job is addressing the lower extremity problem and possible causes. Regarding extra body weight, we encourage patients to discuss diet and weight management plans with their primary care doctors. It is this team approach that gives you the best opportunity for pain relief and getting you back on your feet.            Follow-ups after your visit        Who to contact     If you have questions or need follow up information about today's clinic visit or  "your schedule please contact [facility] [place] directly at [phone].  Normal or non-critical lab and imaging results will be communicated to you by MyChart, letter or phone within 4 business days after the clinic has received the results. If you do not hear from us within 7 days, please contact the clinic through MyChart or phone. If you have a critical or abnormal lab result, we will notify you by phone as soon as possible.  Submit refill requests through [employer] or call your pharmacy and they will forward the refill request to us. Please allow 3 business days for your refill to be completed.          Additional Information About Your Visit        [employer] Information     [employer] lets you send messages to your doctor, view your test results, renew your prescriptions, schedule appointments and more. To sign up, go to [email or web address] . Click on \"Log in\" on the left side of the screen, which will take you to the Welcome page. Then click on \"Sign up Now\" on the right side of the page.     You will be asked to enter the access code listed below, as well as some personal information. Please follow the directions to create your username and password.     Your access code is: [email or web address]  Expires: 2017 11:18 AM     Your access code will  in 90 days. If you need help or a new code, please call your [place] clinic or [phone].        Care EveryWhere ID     This is your Care EveryWhere ID. This could be used by other organizations to access your [place] medical records  [id number]        Your Vitals Were     Pulse Height                82 6' 3\" (1.905 m)           Blood Pressure from Last 3 Encounters:   17 124/82   17 136/73   16 117/77    Weight from Last 3 Encounters:   17 268 lb (121.6 kg)   05/15/17 268 lb (121.6 kg)   17 268 lb (121.6 kg)              Today, you had the following     No orders found for display       Primary Care Provider Office Phone # " Fax #    Shannan Dennis [facility] [phone]       [facility] [address]        Thank you!     Thank you for choosing [facility]  for your care. Our goal is always to provide you with excellent care. Hearing back from our patients is one way we can continue to improve our services. Please take a few minutes to complete the written survey that you may receive in the mail after your visit with us. Thank you!             Your Updated Medication List - Protect others around you: Learn how to safely use, store and throw away your medicines at www.disposemymeds.org.          This list is accurate as of: 6/12/17  3:34 PM.  Always use your most recent med list.                   Brand Name Dispense Instructions for use    ibuprofen 200 MG tablet    ADVIL/MOTRIN     Take 600 mg by mouth every 6 hours as needed for mild pain       insulin glargine 100 UNIT/ML injection    LANTUS     Inject 42 Units Subcutaneous At Bedtime       insulin pen needle 31G X 6 MM     100 each    Use 4 daily or as directed.       METFORMIN HCL PO      Take 1,000 mg by mouth 2 times daily (with meals)       order for DME     1 Device    Equipment being ordered: RollAbout x 3 months       * silver sulfADIAZINE 1 % cream    SILVADENE     Apply topically daily Apply to L foot wound       * silver sulfADIAZINE 1 % cream    SILVADENE    85 g    Apply to wound daily       * Notice:  This list has 2 medication(s) that are the same as other medications prescribed for you. Read the directions carefully, and ask your doctor or other care provider to review them with you.

## 2017-06-12 NOTE — PROGRESS NOTES
"Foot & Ankle Surgery   June 12, 2017    S:  Pt is seen today for evaluation of plantar left midfoot ulcer.  Still trying to keep the weight off the foot with the RollAbout, still considering surgical options.    Vitals:    06/12/17 1533   Pulse: 82   Height: 6' 3\" (1.905 m)   '      ROS - Pos for CC.  Patient denies current nausea, vomiting, chills, fevers, belly pain, calf pain, chest pain or SOB.  Complete remainder of ROS it otherwise neg.      PE:  Gen:   No apparent distress  Neuro:   A&Ox3, no deficits  Psych:    Answering questions appropriately for age and situation with normal affect  Head:    NCAT  Eye:    Visual scanning without deficit  Ear:    Response to auditory stimuli wnl  Lung:    Non-labored breathing on RA noted  Abd:    NTND per patient report  Lymph:    Neg for pitting/non-pitting edema BLE  Vasc:    Pulses palpable, CFT minimally delayed  Neuro:    Light touch sensation intact to all sensory nerve distributions without paresthesias  Derm:    Ulcer is partial thickness 1.8 x 2.0 with full-thickness central 0.4 x 0.8cm, granular base after debridement, no SOI  MSK:    ROM, strength wnl without limitation, no pain on palpation noted.  Calf:    Neg for redness, swelling or tenderness     Assessment:  57 year old male with non-healing plantar L midfoot wound; charcot      Plan:  Discussed etiologies/options  1.  Ulcer plantar left foot  -Excisional debridement was performed to the wound, full-thickness, sharply debridement the wound down to and including exposed sub-cutaneous tissue/fat, excising nonviable tissue to the above dimensions with a tissue nipper    2.  Charcot  -again discussed planing of bony prominence vs arch reconstruction.  Advised planing of bump at this point.      Follow up:  2 weeks or sooner with acute issues    Billing today is based on time > 10 minutes, more than 50% spent counseling and coordinating cares, excluding procedure time        Weight management plan: Patient was " referred to their PCP to discuss a diet and exercise plan.         Deion Pitts DPM   Podiatric Foot & Ankle Surgeon  [facility]  [phone]

## 2017-06-12 NOTE — NURSING NOTE
"Chief Complaint   Patient presents with     RECHECK     wound check, wife says it looks the same as last visit       Initial Pulse 82  Ht 6' 3\" (1.905 m) Estimated body mass index is 33.5 kg/(m^2) as calculated from the following:    Height as of 5/30/17: 6' 3\" (1.905 m).    Weight as of 5/30/17: 268 lb (121.6 kg).  Medication Reconciliation: complete  "

## 2017-06-12 NOTE — PATIENT INSTRUCTIONS
DR. PATTERSON'S CLINIC LOCATIONS:   MONDAY - [place]  TUESDAY - [place]   [address] [facility] [address] [address] [address]   [place] [address] [place] [address]   [phone]       THURSDAY AM - [place] THURSDAY PM - [facility]   [address]   [place] [address] [place] [address]   [phone]       FRIDAY AM - [place] SCHEDULE SURGERY: [phone] [address] APPOINTMENTS: [phone]   [place] [address] AFTER HOURS: [phone] FAX NUMBER: [phone]     Follow Up:     Body Mass Index (BMI)  Many things can cause foot and ankle problems. Foot structure, activity level, foot mechanics and injuries are common causes of pain.  One very important issue that often goes unmentioned, is body weight.  Extra weight can cause increased stress on muscles, ligaments, bones and tendons.  Sometimes just a few extra pounds is all it takes to put one over her/his threshold. Without reducing that stress, it can be difficult to alleviate pain. Some people are uncomfortable addressing this issue, but we feel it is important for you to think about it. As Foot &  Ankle specialists, our job is addressing the lower extremity problem and possible causes. Regarding extra body weight, we encourage patients to discuss diet and weight management plans with their primary care doctors. It is this team approach that gives you the best opportunity for pain relief and getting you back on your feet.

## 2017-07-03 ENCOUNTER — OFFICE VISIT (OUTPATIENT)
Dept: PODIATRY | Facility: CLINIC | Age: 57
End: 2017-07-03
Payer: COMMERCIAL

## 2017-07-03 VITALS — HEART RATE: 78 BPM | WEIGHT: 265 LBS | BODY MASS INDEX: 32.95 KG/M2 | HEIGHT: 75 IN

## 2017-07-03 DIAGNOSIS — L97.522 ULCER OF FOOT, LEFT, WITH FAT LAYER EXPOSED (H): Primary | ICD-10-CM

## 2017-07-03 PROCEDURE — 11042 DBRDMT SUBQ TIS 1ST 20SQCM/<: CPT | Performed by: PODIATRIST

## 2017-07-03 NOTE — PROGRESS NOTES
"Foot & Ankle Surgery   July 3, 2017    S:  Pt is seen today for evaluation of L midfoot ulcer.  RollAbout, minimal   WB, and daily dressings with Aquacel Ag and they're putting silvadene on top of it.  .    Vitals:    07/03/17 1326   Pulse: 78   Weight: 265 lb (120.2 kg)   Height: 6' 3\" (1.905 m)   '      ROS - Pos for CC.  Patient denies current nausea, vomiting, chills, fevers, belly pain, calf pain, chest pain or SOB.  Complete remainder of ROS it otherwise neg.      PE:  Gen:   No apparent distress  Neuro:   A&Ox3, no deficits  Psych:    Answering questions appropriately for age and situation with normal affect  Head:    NCAT  Eye:    Visual scanning without deficit  Ear:    Response to auditory stimuli wnl  Lung:    Non-labored breathing on RA noted  Abd:    NTND per patient report  Lymph:    Neg for pitting/non-pitting edema BLE  Vasc:    Pulses palpable, CFT minimally delayed  Neuro:    Light touch sensation diminished plantarly.  Incorrectly identified on last note as intact  Derm:    Full-thickness wound 8 x 7mm, granular base after debridement, with large macerated ring that was partially debrided as well. No SOI or probing to bone.  MSK:    Chronic midfoot deformity/Charcot with plantar prominence.    Calf:    Neg for redness, swelling or tenderness      Assessment:  57 year old male with ulcer plantar L midfoot      Plan:  Discussed etiologies/options  1.  Ulcer plantar L midfoot  -Excisional debridement was performed to the wound, full-thickness, sharply debridement the wound down to and including exposed sub-cutaneous tissue/fat, excising nonviable tissue to the above dimensions with a tissue nipper  -Rx for Iodosorb; stop Aquacel Ag and silvadene for now  -daily dressing changes  -continue NWB       Follow up:  2 weeks or sooner with acute issues      Body mass index is 33.12 kg/(m^2).  Weight management plan: Patient was referred to their PCP to discuss a diet and exercise plan.         Deion KEBEDE" SINAN Pitts   Podiatric Foot & Ankle Surgeon  [facility]  [phone]

## 2017-07-03 NOTE — PATIENT INSTRUCTIONS
DR. PATTERSON'S CLINIC LOCATIONS:   MONDAY - [place]  TUESDAY - [place]   [address] [facility] [address] [facility] [address]   [place] [address] [place] [address]   [phone]       THURSDAY AM - [place] THURSDAY PM - [facility]   [address]   [place] [address] [place] [address]   [phone]       FRIDAY AM - [place] SCHEDULE SURGERY: [phone] [address] APPOINTMENTS: [phone]   [place] [address] AFTER HOURS: [phone] FAX NUMBER: [phone]     Follow Up:     Body Mass Index (BMI)  Many things can cause foot and ankle problems. Foot structure, activity level, foot mechanics and injuries are common causes of pain.  One very important issue that often goes unmentioned, is body weight.  Extra weight can cause increased stress on muscles, ligaments, bones and tendons.  Sometimes just a few extra pounds is all it takes to put one over her/his threshold. Without reducing that stress, it can be difficult to alleviate pain. Some people are uncomfortable addressing this issue, but we feel it is important for you to think about it. As Foot &  Ankle specialists, our job is addressing the lower extremity problem and possible causes. Regarding extra body weight, we encourage patients to discuss diet and weight management plans with their primary care doctors. It is this team approach that gives you the best opportunity for pain relief and getting you back on your feet.    SMOKING CESSATION  Cigarette smoke contains over 4,000 chemicals. Nicotine is one of the main ingredients which is an insecticide/herbicide. It is poisonous to our nervous system, increases blood clotting risk, and decreases the body's defenses to fight off infection. Another chemical is Carbon Monoxide is an asphyxiating gas that permanently binds to blood cells and blocks the transport of oxygen. This leads to tissue death and decreases your  metabolism. Tar is a chemical that coats your lungs and trachea which impairs new oxygen coming in and carbon dioxide getting out of your body.   WHY QUIT? In 20 minutes your blood pressure will drop back down to normal. In 8 hours the carbon monoxide (a toxic gas) levels in your blood stream will drop by half, and oxygen levels will return to normal. In 48 hours your chance of having a heart attack will have decreased. All nicotine will have left your body. Your sense of taste and smell will return to a normal level. In 72 hours your bronchial tubes will relax, and your energy levels will increase. In 2 weeks your circulation will increase, and it will continue to improve for the next 10 weeks.    HOW TO QUIT  1. Nicotine patches, lozenges, or gum  2. Support Groups ([place] offers this service)  3. Medications (see below)  Varenicline Tartrate (CHANTIX)  Nicotine Gum    Bupropion HCL (WELLBUTRIN, ZYBAN) Nicotine Polacrilex (COMMIT)   Nicotine Patch (NICODERM)  Nicotine Inhaler (NICOTROL)    If you are interested in these, ask about getting a prescription or talk to your primary care doctor about what may be the best way to help you quit.

## 2017-07-03 NOTE — MR AVS SNAPSHOT
After Visit Summary   7/3/2017    Riki Zepeda    MRN: [id number]           Patient Information     Date Of Birth          1960        Visit Information        Provider Department      7/3/2017 1:15 PM Deion Patterson DPM [facility]        Care Instructions      DR. PATTERSON'S CLINIC LOCATIONS:   MONDAY - [place] TUESDAY - [place]   [address] [facility] [address]   [place] [address] [place] [address]   [phone]       THURSDAY AM - [place] [facility]   [address]   [place] [address] [place], [place] [address] [phone]       FRIDAY AM - [place] SCHEDULE SURGERY: [phone]   [address] APPOINTMENTS: [phone]   [place] [address] AFTER HOURS: [phone] FAX NUMBER: [phone]     Follow Up:     Body Mass Index (BMI)  Many things can cause foot and ankle problems. Foot structure, activity level, foot mechanics and injuries are common causes of pain.  One very important issue that often goes unmentioned, is body weight.  Extra weight can cause increased stress on muscles, ligaments, bones and tendons.  Sometimes just a few extra pounds is all it takes to put one over her/his threshold. Without reducing that stress, it can be difficult to alleviate pain. Some people are uncomfortable addressing this issue, but we feel it is important for you to think about it. As Foot &  Ankle specialists, our job is addressing the lower extremity problem and possible causes. Regarding extra body weight, we encourage patients to discuss diet and weight management plans with their primary care doctors. It is this team approach that gives you the best opportunity for pain relief and getting you back on your feet.    SMOKING CESSATION  Cigarette smoke contains over 4,000 chemicals. Nicotine is one of the main ingredients which is an insecticide/herbicide. It is  poisonous to our nervous system, increases blood clotting risk, and decreases the body's defenses to fight off infection. Another chemical is Carbon Monoxide is an asphyxiating gas that permanently binds to blood cells and blocks the transport of oxygen. This leads to tissue death and decreases your metabolism. Tar is a chemical that coats your lungs and trachea which impairs new oxygen coming in and carbon dioxide getting out of your body.   WHY QUIT? In 20 minutes your blood pressure will drop back down to normal. In 8 hours the carbon monoxide (a toxic gas) levels in your blood stream will drop by half, and oxygen levels will return to normal. In 48 hours your chance of having a heart attack will have decreased. All nicotine will have left your body. Your sense of taste and smell will return to a normal level. In 72 hours your bronchial tubes will relax, and your energy levels will increase. In 2 weeks your circulation will increase, and it will continue to improve for the next 10 weeks.    HOW TO QUIT  1. Nicotine patches, lozenges, or gum  2. Support Groups ([place] offers this service)  3. Medications (see below)  Varenicline Tartrate (CHANTIX)  Nicotine Gum    Bupropion HCL (WELLBUTRIN, ZYBAN) Nicotine Polacrilex (COMMIT)   Nicotine Patch (NICODERM)  Nicotine Inhaler (NICOTROL)    If you are interested in these, ask about getting a prescription or talk to your primary care doctor about what may be the best way to help you quit.             Follow-ups after your visit        Who to contact     If you have questions or need follow up information about today's clinic visit or your schedule please contact [facility] [place] directly at [phone].  Normal or non-critical lab and imaging results will be communicated to you by MyChart, letter or phone within 4 business days after the clinic has received the results. If you do not hear from us within 7 days, please contact the clinic through MyChart or phone.  "If you have a critical or abnormal lab result, we will notify you by phone as soon as possible.  Submit refill requests through [employer] or call your pharmacy and they will forward the refill request to us. Please allow 3 business days for your refill to be completed.          Additional Information About Your Visit        [employer] Information     [employer] lets you send messages to your doctor, view your test results, renew your prescriptions, schedule appointments and more. To sign up, go to [email or web address] . Click on \"Log in\" on the left side of the screen, which will take you to the Welcome page. Then click on \"Sign up Now\" on the right side of the page.     You will be asked to enter the access code listed below, as well as some personal information. Please follow the directions to create your username and password.     Your access code is: [email or web address]  Expires: 10/1/2017  1:27 PM     Your access code will  in 90 days. If you need help or a new code, please call your [place] clinic or [phone].        Care EveryWhere ID     This is your Care EveryWhere ID. This could be used by other organizations to access your [place] medical records  [id number]        Your Vitals Were     Pulse Height BMI (Body Mass Index)             78 6' 3\" (1.905 m) 33.12 kg/m2          Blood Pressure from Last 3 Encounters:   17 124/82   17 136/73   16 117/77    Weight from Last 3 Encounters:   17 265 lb (120.2 kg)   17 268 lb (121.6 kg)   05/15/17 268 lb (121.6 kg)              Today, you had the following     No orders found for display       Primary Care Provider Office Phone # Fax #    Shannan [facility] [phone]       [address] [place] [address]        Equal Access to Services     [facility] ROSALEE : Leo Sahu, yareli hernandez, cassidy ma. [facility] [phone].    ATENCIÓN: Si geovany " español, tiene a moore disposición servicios gratuitos de asistencia lingüística. Eunice lynn [phone].    We comply with applicable federal civil rights laws and [place] laws. We do not discriminate on the basis of race, color, national origin, age, disability sex, sexual orientation or gender identity.            Thank you!     Thank you for choosing [facility] [place]  for your care. Our goal is always to provide you with excellent care. Hearing back from our patients is one way we can continue to improve our services. Please take a few minutes to complete the written survey that you may receive in the mail after your visit with us. Thank you!             Your Updated Medication List - Protect others around you: Learn how to safely use, store and throw away your medicines at www.disposemymeds.org.          This list is accurate as of: 7/3/17  1:27 PM.  Always use your most recent med list.                   Brand Name Dispense Instructions for use Diagnosis    ibuprofen 200 MG tablet    ADVIL/MOTRIN     Take 600 mg by mouth every 6 hours as needed for mild pain        insulin glargine 100 UNIT/ML injection    LANTUS     Inject 42 Units Subcutaneous At Bedtime        insulin pen needle 31G X 6 MM     100 each    Use 4 daily or as directed.    Type 2 diabetes mellitus with diabetic neuropathy (H)       METFORMIN HCL PO      Take 1,000 mg by mouth 2 times daily (with meals)        order for DME     1 Device    Equipment being ordered: RollAbout x 3 months    Diabetic ulcer of left foot associated with type 2 diabetes mellitus (H)       * silver sulfADIAZINE 1 % cream    SILVADENE     Apply topically daily Apply to L foot wound        * silver sulfADIAZINE 1 % cream    SILVADENE    85 g    Apply to wound daily    Ulcer of foot, left, limited to breakdown of skin (H)       * Notice:  This list has 2 medication(s) that are the same as other medications prescribed for you. Read the directions carefully, and ask your  doctor or other care provider to review them with you.

## 2017-07-03 NOTE — NURSING NOTE
"Chief Complaint   Patient presents with     RECHECK     L foot wound f/u       Initial Pulse 78  Ht 6' 3\" (1.905 m)  Wt 265 lb (120.2 kg)  BMI 33.12 kg/m2 Estimated body mass index is 33.12 kg/(m^2) as calculated from the following:    Height as of this encounter: 6' 3\" (1.905 m).    Weight as of this encounter: 265 lb (120.2 kg).  Medication Reconciliation: complete  "

## 2017-07-14 ENCOUNTER — TELEPHONE (OUTPATIENT)
Dept: PEDIATRICS | Facility: CLINIC | Age: 57
End: 2017-07-14

## 2017-07-14 NOTE — TELEPHONE ENCOUNTER
Patient calls regarding newly prescribed Cadexomer Iodine, topical, 0.9% (IODOSORB) 0.9 % GEL gel.    Patient states that he is allergic to shellfish and unable to use the gel.  Please advise on the substitute.  I added shellfish allergy to patient's list as it was not there.  Pharmacy loaded, please advise.  Ok to call patient and leave a detailed message.    Diane Alcala RN  Message handled by Nurse Triage.

## 2017-07-18 NOTE — TELEPHONE ENCOUNTER
I called and spoke with Alexey Ritchie to continue with Aquacel Ag and silvadene in place of the iodosorb.    Deion Pitts, REYM   Podiatric Foot & Ankle Surgeon  [facility]

## 2017-07-31 ENCOUNTER — TELEPHONE (OUTPATIENT)
Dept: PODIATRY | Facility: CLINIC | Age: 57
End: 2017-07-31

## 2017-07-31 DIAGNOSIS — L97.929: Primary | ICD-10-CM

## 2017-07-31 NOTE — TELEPHONE ENCOUNTER
----- Message from Peyton Palomares sent at 7/28/2017 11:13 AM CDT -----  Pt calling and requesting a referral to [facility] for a second opinion.    Thank you,    Brenda

## 2017-07-31 NOTE — TELEPHONE ENCOUNTER
Referral printed and mailed.  I LVM notifying him.    Deion Pitts, ERYM   Podiatric Foot & Ankle Surgeon  [facility]

## 2017-08-12 ENCOUNTER — NURSE TRIAGE (OUTPATIENT)
Dept: NURSING | Facility: CLINIC | Age: 57
End: 2017-08-12

## 2017-08-12 NOTE — TELEPHONE ENCOUNTER
Additional Information    Negative: [1] Red area or streak AND [2] fever    Negative: Entire foot is cool or blue in comparison to other foot    Negative: Purple or black skin on foot or toe    Negative: [1] Swollen foot AND [2] fever    Negative: Patient sounds very sick or weak to the triager    Negative: [1] SEVERE pain (e.g., excruciating, unable to do any normal activities) AND [2] not improved after 2 hours of pain medicine    Negative: [1] Looks infected (spreading redness, pus) AND [2] large red area (> 2 in. or 5 cm)    Negative: Looks like a boil, infected sore, or deep ulcer    Negative: [1] Redness of the skin AND [2] no fever    Negative: [1] Swollen foot AND [2] no fever  (Exceptions: localized bump from bunions, calluses, insect bite, sting)    Negative: Numbness in one foot (i.e., loss of sensation)    Negative: [1] MODERATE pain (e.g., interferes with normal activities, limping) AND [2] present > 3 days    Negative: [1] Numbness or tingling in feet AND [2] new or increased    Negative: Pain in the big toe joint    Negative: [1] MILD pain (e.g., does not interfere with normal activities) AND [2] present > 7 days    Negative: Foot pain is a chronic symptom (recurrent or ongoing AND present > 4 weeks)    Negative: Caused by known bunions, plantar wart, or flat feet    Foot pain (all triage questions negative)    Protocols used: FOOT PAIN-ADULT-

## 2017-08-14 ENCOUNTER — OFFICE VISIT (OUTPATIENT)
Dept: PODIATRY | Facility: CLINIC | Age: 57
End: 2017-08-14
Payer: COMMERCIAL

## 2017-08-14 VITALS — BODY MASS INDEX: 32.95 KG/M2 | RESPIRATION RATE: 12 BRPM | WEIGHT: 265 LBS | HEIGHT: 75 IN

## 2017-08-14 DIAGNOSIS — L97.522 ULCER OF LEFT FOOT, WITH FAT LAYER EXPOSED (H): Primary | ICD-10-CM

## 2017-08-14 PROCEDURE — 99213 OFFICE O/P EST LOW 20 MIN: CPT | Mod: 25 | Performed by: PODIATRIST

## 2017-08-14 PROCEDURE — 11042 DBRDMT SUBQ TIS 1ST 20SQCM/<: CPT | Performed by: PODIATRIST

## 2017-08-14 NOTE — NURSING NOTE
"Chief Complaint   Patient presents with     RECHECK     left foot ulcer - doesn't seem to be changing - denies new pain or drainage       Initial Resp 12  Ht 6' 3\" (1.905 m)  Wt 265 lb (120.2 kg)  BMI 33.12 kg/m2 Estimated body mass index is 33.12 kg/(m^2) as calculated from the following:    Height as of this encounter: 6' 3\" (1.905 m).    Weight as of this encounter: 265 lb (120.2 kg).  Medication Reconciliation: complete  Maude Cameron CMA  "

## 2017-08-14 NOTE — MR AVS SNAPSHOT
"              After Visit Summary   2017    Riki Zepeda    MRN: [id number]           Patient Information     Date Of Birth          1960        Visit Information        Provider Department      2017 11:45 AM Deion Pitts DPM [facility] [place]        Today's Diagnoses     Ulcer of left foot, with fat layer exposed (H)    -  1       Follow-ups after your visit        Follow-up notes from your care team     Return in about 3 weeks (around 2017).      Who to contact     If you have questions or need follow up information about today's clinic visit or your schedule please contact [facility] directly at [phone].  Normal or non-critical lab and imaging results will be communicated to you by MyChart, letter or phone within 4 business days after the clinic has received the results. If you do not hear from us within 7 days, please contact the clinic through MyChart or phone. If you have a critical or abnormal lab result, we will notify you by phone as soon as possible.  Submit refill requests through [employer] or call your pharmacy and they will forward the refill request to us. Please allow 3 business days for your refill to be completed.          Additional Information About Your Visit        MyChart Information     [employer] lets you send messages to your doctor, view your test results, renew your prescriptions, schedule appointments and more. To sign up, go to [email or web address] . Click on \"Log in\" on the left side of the screen, which will take you to the Welcome page. Then click on \"Sign up Now\" on the right side of the page.     You will be asked to enter the access code listed below, as well as some personal information. Please follow the directions to create your username and password.     Your access code is: [email or web address]  Expires: 10/1/2017  1:27 PM     Your access code will  in 90 days. If you need help or a new code, please call your [facility] or " "[phone].        Care EveryWhere ID     This is your Care EveryWhere ID. This could be used by other organizations to access your [place] medical records  [id number]        Your Vitals Were     Respirations Height BMI (Body Mass Index)             12 6' 3\" (1.905 m) 33.12 kg/m2          Blood Pressure from Last 3 Encounters:   05/30/17 124/82   01/02/17 136/73   08/23/16 117/77    Weight from Last 3 Encounters:   08/14/17 265 lb (120.2 kg)   07/03/17 265 lb (120.2 kg)   05/30/17 268 lb (121.6 kg)              We Performed the Following     DEBRIDE SKIN/SUBQ TISSUE        Primary Care Provider Office Phone # Fax #    Shannan [facility] [phone]       [address] [facility] [address]        Equal Access to Services     [facility] : Hadii felix krishnano Sojudith, waaxda luqadaha, qaybta kaalmada adeadeyada, cassidy [place] . So [facility] [phone].    ATENCIÓN: Si habla español, tiene a moore disposición servicios gratuitos de asistencia lingüística. Eunice al [phone].    We comply with applicable federal civil rights laws and [place] laws. We do not discriminate on the basis of race, color, national origin, age, disability sex, sexual orientation or gender identity.            Thank you!     Thank you for choosing [facility]  for your care. Our goal is always to provide you with excellent care. Hearing back from our patients is one way we can continue to improve our services. Please take a few minutes to complete the written survey that you may receive in the mail after your visit with us. Thank you!             Your Updated Medication List - Protect others around you: Learn how to safely use, store and throw away your medicines at www.disposemymeds.org.          This list is accurate as of: 8/14/17 11:59 PM.  Always use your most recent med list.                   Brand Name Dispense Instructions for use Diagnosis    Cadexomer Iodine (topical) " 0.9% 0.9 % Gel gel    IODOSORB    10 g    Apply to base of wound daily with dressing changes.    Ulcer of foot, left, with fat layer exposed (H)       ibuprofen 200 MG tablet    ADVIL/MOTRIN     Take 600 mg by mouth every 6 hours as needed for mild pain        insulin glargine 100 UNIT/ML injection    LANTUS     Inject 42 Units Subcutaneous At Bedtime        insulin pen needle 31G X 6 MM     100 each    Use 4 daily or as directed.    Type 2 diabetes mellitus with diabetic neuropathy (H)       METFORMIN HCL PO      Take 1,000 mg by mouth 2 times daily (with meals)        order for DME     1 Device    Equipment being ordered: RollAbout x 3 months    Diabetic ulcer of left foot associated with type 2 diabetes mellitus (H)       * silver sulfADIAZINE 1 % cream    SILVADENE     Apply topically daily Apply to L foot wound        * silver sulfADIAZINE 1 % cream    SILVADENE    85 g    Apply to wound daily    Ulcer of foot, left, limited to breakdown of skin (H)       * Notice:  This list has 2 medication(s) that are the same as other medications prescribed for you. Read the directions carefully, and ask your doctor or other care provider to review them with you.

## 2017-08-14 NOTE — PROGRESS NOTES
"Foot & Ankle Surgery   August 14, 2017    S:  Pt is seen today for evaluation of L midfoot ulcer 2/2 to charcot changes of the foot.  NWB on RollAbout, using Aquacel Ag.  He's been doing his pumice therapy.  We have spoken multiple times about surgery.  They tried to get a 2nd opinion at [place], but were interested in a second opinion near here.    Vitals:    08/14/17 1154   Resp: 12   Weight: 265 lb (120.2 kg)   Height: 6' 3\" (1.905 m)   '      ROS - Pos for CC.  Patient denies current nausea, vomiting, chills, fevers, belly pain, calf pain, chest pain or SOB.  Complete remainder of ROS it otherwise neg.      PE:  Gen:   No apparent distress  Neuro:   A&Ox3, no deficits  Psych:    Answering questions appropriately for age and situation with normal affect  Head:    NCAT  Eye:    Visual scanning without deficit  Ear:    Response to auditory stimuli wnl  Lung:    Non-labored breathing on RA noted  Abd:    NTND per patient report  Lymph:    Neg for pitting/non-pitting edema BLE  Vasc:    Pulses palpable, CFT minimally delayed  Neuro:    Light touch sensation diminished plantarly left lower extremity   Derm:    Ulcer is full-thickness, 1.5 x 0.8cm, granular base after debridement.  No deep probing or SOI.  MSK:    Chronic midfoot deformity/Charcot with plantar prominence  Calf:    Neg for redness, swelling or tenderness      Assessment:  57 year old male with ulcer L midfoot      Plan:  Discussed etiologies/options  1.  Midfoot L foot full-thickness ulcer  -Excisional debridement was performed to the wound, full-thickness, sharply debridement the wound down to and including exposed sub-cutaneous tissue/fat, excising nonviable tissue to the above dimensions with a tissue nipper  -NWB with RollAbout  -daily cares - wash/dry, Aquacel Ag, dressing   -recommend Dr MAGDALENA Ruvalcaba and/or Dr. QUYEN Velez for 2nd opinion for wound care and surgical options. We had discussed bump resection vs reconstruction of foot, and I had recommended " starting with planing of bone.    Follow up:  3 weeks or sooner with acute issues      Body mass index is 33.12 kg/(m^2).  Weight management plan: Patient was referred to their PCP to discuss a diet and exercise plan.         Deion Pitts DPM   Podiatric Foot & Ankle Surgeon  [facility]  [phone]

## 2017-09-07 ENCOUNTER — OFFICE VISIT (OUTPATIENT)
Dept: PODIATRY | Facility: CLINIC | Age: 57
End: 2017-09-07
Payer: COMMERCIAL

## 2017-09-07 ENCOUNTER — TELEPHONE (OUTPATIENT)
Dept: PODIATRY | Facility: CLINIC | Age: 57
End: 2017-09-07

## 2017-09-07 VITALS — TEMPERATURE: 82 F | OXYGEN SATURATION: 98 % | DIASTOLIC BLOOD PRESSURE: 84 MMHG | SYSTOLIC BLOOD PRESSURE: 139 MMHG

## 2017-09-07 DIAGNOSIS — E11.621 DIABETIC ULCER OF LEFT MIDFOOT ASSOCIATED WITH TYPE 2 DIABETES MELLITUS, WITH FAT LAYER EXPOSED (H): Primary | ICD-10-CM

## 2017-09-07 DIAGNOSIS — L97.422 DIABETIC ULCER OF LEFT MIDFOOT ASSOCIATED WITH TYPE 2 DIABETES MELLITUS, WITH FAT LAYER EXPOSED (H): Primary | ICD-10-CM

## 2017-09-07 PROCEDURE — 11042 DBRDMT SUBQ TIS 1ST 20SQCM/<: CPT | Performed by: PODIATRIST

## 2017-09-07 PROCEDURE — 99213 OFFICE O/P EST LOW 20 MIN: CPT | Mod: 25 | Performed by: PODIATRIST

## 2017-09-07 NOTE — MR AVS SNAPSHOT
After Visit Summary   9/7/2017    Riki Zepeda    MRN: [id number]           Patient Information     Date Of Birth          1960        Visit Information        Provider Department      9/7/2017 10:15 AM Deion Pitts DPM [place] [facility]        Today's Diagnoses     Diabetic ulcer of left midfoot associated with type 2 diabetes mellitus, with fat layer exposed (H)    -  1       Follow-ups after your visit        Additional Services     WOUND CARE REFERRAL       Your provider has referred you to: [place]:  W[facility] [place] at [facility] [phone] FAX REFERRAL TO [phone] [email or web address]    Reason for referral: Wound care      1. WOC Wound Consult appointment is related to what kind of wound: Diabetic foot ulcer    2. Location of wound: Lower extremity    3. Reason for referral: Assess and treat as indicated    4. Desired treatment if any: any available.       Please be aware that coverage of these services is subject to the terms and limitations of your health insurance plan.  Call member services at your health plan with any benefit or coverage questions.      Please bring the following with you to your appointment:    (1) Any X-Rays, CTs or MRIs which have been performed.  Contact the facility where they were done to arrange for  prior to your scheduled appointment.    (2) List of current medications   (3) This referral request   (4) Any documents/labs given to you for this referral                  Follow-up notes from your care team     Return if symptoms worsen or fail to improve.      Who to contact     If you have questions or need follow up information about today's clinic visit or your schedule please contact [facility] directly at [phone].  Normal or non-critical lab and imaging results will be communicated to you by MyChart, letter or phone within 4 business days after the clinic has received  "the results. If you do not hear from us within 7 days, please contact the clinic through [employer] or phone. If you have a critical or abnormal lab result, we will notify you by phone as soon as possible.  Submit refill requests through [employer] or call your pharmacy and they will forward the refill request to us. Please allow 3 business days for your refill to be completed.          Additional Information About Your Visit        [employer] Information     [employer] lets you send messages to your doctor, view your test results, renew your prescriptions, schedule appointments and more. To sign up, go to [email or web address] . Click on \"Log in\" on the left side of the screen, which will take you to the Welcome page. Then click on \"Sign up Now\" on the right side of the page.     You will be asked to enter the access code listed below, as well as some personal information. Please follow the directions to create your username and password.     Your access code is: [email or web address]  Expires: 10/1/2017  1:27 PM     Your access code will  in 90 days. If you need help or a new code, please call your [place] clinic or [phone].        Care EveryWhere ID     This is your Care EveryWhere ID. This could be used by other organizations to access your [place] medical records  [id number]        Your Vitals Were     Temperature Pulse Oximetry                82  F (27.8  C) 98%           Blood Pressure from Last 3 Encounters:   17 139/84   17 124/82   17 136/73    Weight from Last 3 Encounters:   17 265 lb (120.2 kg)   17 265 lb (120.2 kg)   17 268 lb (121.6 kg)              We Performed the Following     DEBRIDE SKIN/SUBQ TISSUE     WOUND CARE REFERRAL        Primary Care Provider Office Phone # Fax #    Shannan [facility] [phone]       [facility] [address]        Equal Access to Services     JADYN TURK AH: yareli Gu, " cassidy ma ah. So [facility] [phone].    ATENCIÓN: Si geovany arteaga, tiene a moore disposición servicios gratuitos de asistencia lingüística. Eunice al [phone].    We comply with applicable federal civil rights laws and [place] laws. We do not discriminate on the basis of race, color, national origin, age, disability sex, sexual orientation or gender identity.            Thank you!     Thank you for choosing [facility]  for your care. Our goal is always to provide you with excellent care. Hearing back from our patients is one way we can continue to improve our services. Please take a few minutes to complete the written survey that you may receive in the mail after your visit with us. Thank you!             Your Updated Medication List - Protect others around you: Learn how to safely use, store and throw away your medicines at www.disposemymeds.org.          This list is accurate as of: 9/7/17 10:40 AM.  Always use your most recent med list.                   Brand Name Dispense Instructions for use Diagnosis    Cadexomer Iodine (topical) 0.9% 0.9 % Gel gel    IODOSORB    10 g    Apply to base of wound daily with dressing changes.    Ulcer of foot, left, with fat layer exposed (H)       ibuprofen 200 MG tablet    ADVIL/MOTRIN     Take 600 mg by mouth every 6 hours as needed for mild pain        insulin glargine 100 UNIT/ML injection    LANTUS     Inject 42 Units Subcutaneous At Bedtime        insulin pen needle 31G X 6 MM     100 each    Use 4 daily or as directed.    Type 2 diabetes mellitus with diabetic neuropathy (H)       METFORMIN HCL PO      Take 1,000 mg by mouth 2 times daily (with meals)        order for DME     1 Device    Equipment being ordered: RollAbout x 3 months    Diabetic ulcer of left foot associated with type 2 diabetes mellitus (H)       * silver sulfADIAZINE 1 % cream    SILVADENE     Apply topically daily Apply to L foot  wound        * silver sulfADIAZINE 1 % cream    SILVADENE    85 g    Apply to wound daily    Ulcer of foot, left, limited to breakdown of skin (H)       * Notice:  This list has 2 medication(s) that are the same as other medications prescribed for you. Read the directions carefully, and ask your doctor or other care provider to review them with you.

## 2017-09-07 NOTE — PROGRESS NOTES
Foot & Ankle Surgery   September 7, 2017    S:  Pt is seen today for evaluation of L midfoot ulcer. Has not seen Dr Velez or Jordon for 2nd opinion, they would like me to do the surgery.  Aquacel Ag, states he's been off the foot mostly, but does stand/ambulate on the foot, including getting in/out of cars and on stairs.  The wound has not shown much improvement..    Vitals:    09/07/17 1019   BP: 139/84   Temp: (!) 82  F (27.8  C)   SpO2: 98%   '      ROS - Pos for CC.  Patient denies current nausea, vomiting, chills, fevers, belly pain, calf pain, chest pain or SOB.  Complete remainder of ROS it otherwise neg.      PE:  Gen:   No apparent distress  Neuro:   A&Ox3, no deficits  Psych:    Answering questions appropriately for age and situation with normal affect  Head:    NCAT  Eye:    Visual scanning without deficit  Ear:    Response to auditory stimuli wnl  Lung:    Non-labored breathing on RA noted  Abd:    NTND per patient report  Lymph:    Neg for pitting/non-pitting edema BLE  Vasc:    Pulses palpable, CFT minimally delayed  Neuro:    Light touch sensation absent distally  Derm:    Full-thickness wound plantar L midfoot, 1.3 x 1.0cm, granular base after debridement without SOI  MSK:    Left lower extremity midfoot collapse with plantar bony prominence  Calf:    Neg for redness, swelling or tenderness    Assessment:  57 year old male with nonhealing diabetic foot ulcer, full-thickness      Plan:  Discussed etiologies/options  1.  Full-thickness diabetic foot ulcer left lower extremity   -Excisional debridement was performed to the wound, full-thickness, sharply debridement the wound down to and including exposed sub-cutaneous tissue/fat, excising nonviable tissue to the above dimensions with a tissue nipper  -again discussed surgical options, including planing off bony prominence vs arch reconstruction.  With respect to planing off the prominence, discussed through a separate incision vs excising ulcer and  flapping wound closed  -discussed concerns about operating while the wound is open, and contaminating the surgical site  -Wound [place] referral for further ulcer treatment options, contact information given     Follow up:  prn or sooner with acute issues      There is no height or weight on file to calculate BMI.    Weight management plan: Patient was referred to their PCP to discuss a diet and exercise plan.         Deion Pitts DPM   Podiatric Foot & Ankle Surgeon  [facility]  [phone]

## 2017-09-07 NOTE — TELEPHONE ENCOUNTER
Reason for Call:  Medication or medication refill:    Do you use a [facility]?  Name of the pharmacy and phone number for the current request:       [id number] - [place], [place] - [address] AT [address]    Name of the medication requested: Aqua Sasha AG    Other request: Please call the patient when Rx has been sent  The patient said that Dr. Salinas said he would order it     Can we leave a detailed message on this number? YES    Phone number patient can be reached at: Home number on file [phone] (home)    Best Time: anytime    Call taken on 9/7/2017 at 2:55 PM by Marii Rich

## 2017-09-08 RX ORDER — FOAM BANDAGE 4" X 4"
1 BANDAGE TOPICAL DAILY
Qty: 10 EACH | Refills: 3 | Status: SHIPPED | OUTPATIENT
Start: 2017-09-08 | End: 2017-09-15

## 2017-09-08 NOTE — TELEPHONE ENCOUNTER
Routed to Dr. Pitts to advise.    Berna Hansen CMA ([facility])  Podiatry/Foot & Ankle Surgery  [facility]

## 2017-09-08 NOTE — TELEPHONE ENCOUNTER
Rx sent, will notify patient.    Deion Pitts DPM   Podiatric Foot & Ankle Surgeon  [facility]

## 2017-09-15 ENCOUNTER — HOSPITAL ENCOUNTER (OUTPATIENT)
Dept: WOUND CARE | Facility: CLINIC | Age: 57
Discharge: HOME | End: 2017-09-15
Attending: PODIATRIST | Admitting: PODIATRIST
Payer: COMMERCIAL

## 2017-09-15 VITALS — SYSTOLIC BLOOD PRESSURE: 130 MMHG | TEMPERATURE: 97.8 F | DIASTOLIC BLOOD PRESSURE: 74 MMHG | HEART RATE: 74 BPM

## 2017-09-15 PROCEDURE — A6021 COLLAGEN DRESSING <=16 SQ IN: HCPCS

## 2017-09-15 PROCEDURE — 11042 DBRDMT SUBQ TIS 1ST 20SQCM/<: CPT

## 2017-09-15 PROCEDURE — 11042 DBRDMT SUBQ TIS 1ST 20SQCM/<: CPT | Performed by: PODIATRIST

## 2017-09-15 PROCEDURE — 27211248 ZZH DRESSING HYDROFERA BLUE READY 4 X 5

## 2017-09-15 PROCEDURE — 99214 OFFICE O/P EST MOD 30 MIN: CPT | Mod: 25

## 2017-09-15 PROCEDURE — 99203 OFFICE O/P NEW LOW 30 MIN: CPT | Mod: 25 | Performed by: PODIATRIST

## 2017-09-15 NOTE — PROGRESS NOTES
"[facility] [place] Progress Note    Subject: Patient was seen at wound center.  Patient has had chronic left foot ulceration for about 10 months. Had foot surgery by an outside physician and notes that after surgery his foot collapsed. He ended up with a \"bump\" on the bottom of his foot and that certainly developed a blister and ulcer. It has not healed since. He has previously seen Dr. Pitts is been treating the wound.  There is that he has been offloading the foot within the roller however he does put weight on it to get in and out of the vehicle. Denies fever, nausea, vomiting, shortness of breath. Is wondering if surgery is an option. He has previously gotten x-rays and MRIs which were negative for bone infection however he does note that there is some bone sticking down in that area.    PMH:   Past Medical History:   Diagnosis Date     Diabetes (H)        Social Hx:   Social History     Social History     Marital status:      Spouse name: N/A     Number of children: N/A     Years of education: N/A     Occupational History     Not on file.     Social History Main Topics     Smoking status: Current Some Day Smoker     Smokeless tobacco: Not on file     Alcohol use Yes      Comment: moderate     Drug use: No     Sexual activity: Not on file     Other Topics Concern     Not on file     Social History Narrative       Surgical Hx:   Past Surgical History:   Procedure Laterality Date     INCISION AND DRAINAGE FOOT, COMBINED Left 12/29/2016    Procedure: COMBINED INCISION AND DRAINAGE FOOT;  Surgeon: Mauricio Case DPM;  Location: [facility] OR     ORTHOPEDIC SURGERY         Allergies:    Allergies   Allergen Reactions     Shellfish-Derived Products        Medications:   Current Outpatient Prescriptions   Medication     silver sulfADIAZINE (SILVADENE) 1 % cream     order for DME     insulin glargine (LANTUS) 100 UNIT/ML injection     METFORMIN HCL PO     silver sulfADIAZINE (SILVADENE) 1 % " cream     insulin pen needle 31G X 6 MM     ibuprofen (ADVIL,MOTRIN) 200 MG tablet     No current facility-administered medications for this encounter.      A1C: 7.1    Objective:  Vitals:  /74  Pulse 74  Temp 97.8  F (36.6  C) (Tympanic)    General:  Patient is alert and orientated.  NAD     Dermatologic: Large hyperkeratotic lesion to the plantar aspect of the left foot. Upon debridement ulceration measuring 1.0 x 1.4 x 0.1 cm. No surrounding erythema. Drainage or malodor noted. Base of the wound has to expose but no necrosis of muscle or bone noted.     Vascular: DP & PT pulses are intact & regular bilaterally.   edema and varicosities noted.  CFT and skin temperature is normal to both lower extremities.     Neurologic: Lower extremity sensation is absent.     Musculoskeletal: Patient is ambulatory with knee roller.  No gross ankle deformity noted.  No foot or ankle joint effusion is noted.    Imaging:  Xray:  Progressive Charcot arthropathy in the midfoot    Assessment: A 57-year-old diabetic male with Charcot and chronic ulceration left foot with fat layer exposed.    Plan:  Wound was debrided. At this time continue to recommend offloading in the knee roller. Discussed that pressure is the biggest thing keeping this open.  Reviewed x-rays. Discussed that he does have collapse of bone of the midfoot. I would not recommend any hardware in this area as this would put him at high risk for bone infection and leg amputation. We will try endo-Form Hydrofera Blue dressing changes. Once this is healed recommend possible crow walker to keep pressure off of the area. Discussed if this ulcer does not continue to heal possibly going in excising the ulcer removing some of the bone and putting the wound VAC on this area however he would be homebound for a couple months at that time. He's not interested in a wound VAC. We'll have him follow-up in 2 weeks for reassessment. Was told to call with further questions or  concerns.    Procedure:  After verbal consent, per protocol lidocaine was applied to the wound. Excisional debridement was performed on ulcer.   #15 blade was used to debride ulcer down to and including subcutaneous tissue. Bleeding controlled with light pressure.   No drainage noted.  No anesthesia was used due to neuropathy. Dry dressing applied to foot.  Patient tolerated procedure well.      Elodia Velez DPM, Podiatry/Foot and Ankle Surgery

## 2017-09-29 ENCOUNTER — HOSPITAL ENCOUNTER (OUTPATIENT)
Dept: WOUND CARE | Facility: CLINIC | Age: 57
Discharge: HOME | End: 2017-09-29
Attending: PODIATRIST | Admitting: PODIATRIST
Payer: COMMERCIAL

## 2017-09-29 VITALS — SYSTOLIC BLOOD PRESSURE: 154 MMHG | HEART RATE: 78 BPM | DIASTOLIC BLOOD PRESSURE: 77 MMHG | TEMPERATURE: 97.8 F

## 2017-09-29 DIAGNOSIS — L89.893 DECUBITUS ULCER OF LEFT FOOT, STAGE 3 (H): Primary | ICD-10-CM

## 2017-09-29 DIAGNOSIS — E11.610 CHARCOT FOOT DUE TO DIABETES MELLITUS (H): ICD-10-CM

## 2017-09-29 PROCEDURE — 11042 DBRDMT SUBQ TIS 1ST 20SQCM/<: CPT

## 2017-09-29 PROCEDURE — 27211248 ZZH DRESSING HYDROFERA BLUE READY 4 X 5

## 2017-09-29 PROCEDURE — 11042 DBRDMT SUBQ TIS 1ST 20SQCM/<: CPT | Performed by: PODIATRIST

## 2017-09-29 PROCEDURE — A6021 COLLAGEN DRESSING <=16 SQ IN: HCPCS

## 2017-09-29 NOTE — PROGRESS NOTES
[facility] W[facility] [place] Progress Note    Subject: Patient was seen at wound center.  He is here for follow-up on left foot ulceration. They've been doing end of form and Hydrofera Blue dressing changes. He's been offloading in the roller. Denies fever, nausea, vomiting, shortness of breath.  Notes drainage has decreased.    PMH:   Past Medical History:   Diagnosis Date     Diabetes (H)        Social Hx:   Social History     Social History     Marital status:      Spouse name: N/A     Number of children: N/A     Years of education: N/A     Occupational History     Not on file.     Social History Main Topics     Smoking status: Current Some Day Smoker     Smokeless tobacco: Not on file     Alcohol use Yes      Comment: moderate     Drug use: No     Sexual activity: Not on file     Other Topics Concern     Not on file     Social History Narrative       Surgical Hx:   Past Surgical History:   Procedure Laterality Date     INCISION AND DRAINAGE FOOT, COMBINED Left 12/29/2016    Procedure: COMBINED INCISION AND DRAINAGE FOOT;  Surgeon: Mauricio Case DPM;  Location: [facility] OR     ORTHOPEDIC SURGERY         Allergies:    Allergies   Allergen Reactions     Shellfish-Derived Products        Medications:   Current Outpatient Prescriptions   Medication     silver sulfADIAZINE (SILVADENE) 1 % cream     order for DME     insulin glargine (LANTUS) 100 UNIT/ML injection     METFORMIN HCL PO     silver sulfADIAZINE (SILVADENE) 1 % cream     insulin pen needle 31G X 6 MM     ibuprofen (ADVIL,MOTRIN) 200 MG tablet     No current facility-administered medications for this encounter.          Objective:  Vitals:  /77  Pulse 78  Temp 97.8  F (36.6  C) (Tympanic)    A1C: 7.1    General:  Patient is alert and orientated.  NAD     Dermatologic: full-thickness ulceration to the plantar aspect of the left mid foot measuring 0.9 cm x 0.9 cm x 0.1 cm. Base of the wound is granular. No strong erythema. Drainage or  malodor noted.     Vascular: DP & PT pulses are intact & regular bilaterally. Minimal edema and varicosities noted.  CFT and skin temperature is normal to both lower extremities.     Neurologic: Lower extremity sensation absent.     Musculoskeletal: Patient is ambulatory without assistive device or brace.  Rocker-bottom appearance of the left foot with collapse of the arch.    Imaging:  Xray:  Progressive Charcot arthropathy in the midfoot     Assessment: A 57-year-old diabetic male with Charcot and chronic ulceration left foot with fat layer exposed  Plan:  Wound was depleted. At this time we will continue with the end of form and Hydrofera Blue dressing changes. He will continue offloading with the knee roller. Discussed possible rocker bottom shoes once this is healed up and possibly removal of the plantar prominent bone to the area. Do not want to do this normally before the wound is healed to try to prevent infection.    Procedure:  After verbal consent, per protocol lidocaine was applied to the wound. Excisional debridement was performed on ulcer.   #15 blade was used to debride ulcer down to and including subcutaneous tissue. Bleeding controlled with light pressure.   No drainage noted.  No anesthesia was used due to neuropathy. Dry dressing applied to foot.  Patient tolerated procedure well.      Elodia Velez DPM, Podiatry/Foot and Ankle Surgery

## 2017-09-29 NOTE — IP AVS SNAPSHOT
[facility] [place]    [address] [facility] [address] [place] [id number]    Phone:  [phone]                                       After Visit Summary   9/29/2017    Riki Zepeda    MRN: [id number]           After Visit Summary Signature Page     I have received my discharge instructions, and my questions have been answered. I have discussed any challenges I see with this plan with the nurse or doctor.    ..........................................................................................................................................  Patient/Patient Representative Signature      ..........................................................................................................................................  Patient Representative Print Name and Relationship to Patient    ..................................................               ................................................  Date                                            Time    ..........................................................................................................................................  Reviewed by Signature/Title    ...................................................              ..............................................  Date                                                            Time

## 2017-09-29 NOTE — IP AVS SNAPSHOT
MRN:[id number]                      After Visit Summary   9/29/2017    Riki Zepeda    MRN: [id number]           Visit Information        Provider Department      9/29/2017 10:30 AM Elodia Velez DPM, Podiatry/Foot and Ankle Surgery [facility] [place]           Review of your medicines      UNREVIEWED medicines. Ask your doctor about these medicines        Dose / Directions    ibuprofen 200 MG tablet   Commonly known as:  ADVIL/MOTRIN        Dose:  600 mg   Take 600 mg by mouth every 6 hours as needed for mild pain   Refills:  0       insulin glargine 100 UNIT/ML injection   Commonly known as:  LANTUS        Dose:  42 Units   Inject 42 Units Subcutaneous At Bedtime   Refills:  0       METFORMIN HCL PO        Dose:  1000 mg   Take 1,000 mg by mouth 2 times daily (with meals)   Refills:  0       * silver sulfADIAZINE 1 % cream   Commonly known as:  SILVADENE        Apply topically daily Apply to L foot wound   Refills:  0       * silver sulfADIAZINE 1 % cream   Commonly known as:  SILVADENE   Used for:  Ulcer of foot, left, limited to breakdown of skin (H)        Apply to wound daily   Quantity:  85 g   Refills:  1       * Notice:  This list has 2 medication(s) that are the same as other medications prescribed for you. Read the directions carefully, and ask your doctor or other care provider to review them with you.      CONTINUE these medicines which have NOT CHANGED        Dose / Directions    insulin pen needle 31G X 6 MM   Used for:  Type 2 diabetes mellitus with diabetic neuropathy (H)        Use 4 daily or as directed.   Quantity:  100 each   Refills:  prn       order for DME   Used for:  Diabetic ulcer of left foot associated with type 2 diabetes mellitus (H)        Equipment being ordered: RollAbout x 3 months   Quantity:  1 Device   Refills:  0                Protect others around you: Learn how to safely use, store and throw away your medicines at  "www.disposemymeds.org.         Follow-ups after your visit         Care Instructions        Further instructions from your care team       FOOT CARE NURSES  If you are interested in having a foot care nurse come out to your   home, please call one of these contacts for more information:  [facility]  [phone] [facility]  [phone]   [facility]  [phone]  [facility] [phone]  [place]   [facility]  [phone]  At [facility] [phone]            Additional Information About Your Visit        MyChart Information     [employer] lets you send messages to your doctor, view your test results, renew your prescriptions, schedule appointments and more. To sign up, go to [email or web address] . Click on \"Log in\" on the left side of the screen, which will take you to the Welcome page. Then click on \"Sign up Now\" on the right side of the page.     You will be asked to enter the access code listed below, as well as some personal information. Please follow the directions to create your username and password.     Your access code is: [email or web address]  Expires: 10/1/2017  1:27 PM     Your access code will  in 90 days. If you need help or a new code, please call your [place] clinic or [phone].        Care EveryWhere ID     This is your Care EveryWhere ID. This could be used by other organizations to access your [place] medical records  [id number]        Your Vitals Were     Blood Pressure Pulse Temperature             154/77 78 97.8  F (36.6  C) (Tympanic)          Primary Care Provider Office Phone # Fax #    [facility] [phone]      Equal Access to Services     JADYN TURK : Hadii felix krishnano Sojudith, waaxda luqadaha, qaybta kaalmada adeegyada, cassidy quintero. So [facility] [phone].    ATENCIÓN: Si habla español, tiene a moore disposición servicios gratuitos de " jessicaa lingüística. Eunice al [phone].    We comply with applicable federal civil rights laws and [place] laws. We do not discriminate on the basis of race, color, national origin, age, disability sex, sexual orientation or gender identity.            Thank you!     Thank you for choosing [facility] for your care. Our goal is always to provide you with excellent care. Hearing back from our patients is one way we can continue to improve our services. Please take a few minutes to complete the written survey that you may receive in the mail after you visit with us. Thank you!             Medication List: This is a list of all your medications and when to take them. Check marks below indicate your daily home schedule. Keep this list as a reference.      Medications           Morning Afternoon Evening Bedtime As Needed    ibuprofen 200 MG tablet   Commonly known as:  ADVIL/MOTRIN   Take 600 mg by mouth every 6 hours as needed for mild pain                                insulin glargine 100 UNIT/ML injection   Commonly known as:  LANTUS   Inject 42 Units Subcutaneous At Bedtime                                insulin pen needle 31G X 6 MM   Use 4 daily or as directed.                                METFORMIN HCL PO   Take 1,000 mg by mouth 2 times daily (with meals)                                order for DME   Equipment being ordered: RollAbout x 3 months                                * silver sulfADIAZINE 1 % cream   Commonly known as:  SILVADENE   Apply topically daily Apply to L foot wound                                * silver sulfADIAZINE 1 % cream   Commonly known as:  SILVADENE   Apply to wound daily                                * Notice:  This list has 2 medication(s) that are the same as other medications prescribed for you. Read the directions carefully, and ask your doctor or other care provider to review them with you.

## 2017-09-29 NOTE — DISCHARGE INSTRUCTIONS
FOOT CARE NURSES  If you are interested in having a foot care nurse come out to your   home, please call one of these contacts for more information:  [place] [facility]  [phone] [facility]  [phone]   [facility]  [phone]  [place] [phone]  [place]   [facility]  [phone]  At [facility] [phone]

## 2017-10-20 ENCOUNTER — HOSPITAL ENCOUNTER (OUTPATIENT)
Dept: WOUND CARE | Facility: CLINIC | Age: 57
Discharge: HOME | End: 2017-10-20
Attending: PODIATRIST | Admitting: PODIATRIST
Payer: COMMERCIAL

## 2017-10-20 VITALS
TEMPERATURE: 97.9 F | RESPIRATION RATE: 16 BRPM | HEART RATE: 98 BPM | DIASTOLIC BLOOD PRESSURE: 73 MMHG | SYSTOLIC BLOOD PRESSURE: 132 MMHG

## 2017-10-20 DIAGNOSIS — E11.621 DIABETIC ULCER OF LEFT MIDFOOT ASSOCIATED WITH TYPE 2 DIABETES MELLITUS, WITH FAT LAYER EXPOSED (H): Primary | ICD-10-CM

## 2017-10-20 DIAGNOSIS — L97.422 DIABETIC ULCER OF LEFT MIDFOOT ASSOCIATED WITH TYPE 2 DIABETES MELLITUS, WITH FAT LAYER EXPOSED (H): Primary | ICD-10-CM

## 2017-10-20 PROCEDURE — 11042 DBRDMT SUBQ TIS 1ST 20SQCM/<: CPT

## 2017-10-20 PROCEDURE — A6210 FOAM DRG >16<=48 SQ IN W/O B: HCPCS

## 2017-10-20 PROCEDURE — 11042 DBRDMT SUBQ TIS 1ST 20SQCM/<: CPT | Performed by: PODIATRIST

## 2017-10-20 NOTE — PROGRESS NOTES
[facility] Wound [facility] [place] Progress Note    Subject: Patient was seen at wound center.  Presents to clinic for follow-up on plantar left foot ulceration. Has been using Hydrofera Blue and a deformed dressing changes. Has been offloading with a knee roller. Denies fever, nausea, vomiting.    PMH:   Past Medical History:   Diagnosis Date     Diabetes (H)        Social Hx:   Social History     Social History     Marital status:      Spouse name: N/A     Number of children: N/A     Years of education: N/A     Occupational History     Not on file.     Social History Main Topics     Smoking status: Current Some Day Smoker     Smokeless tobacco: Not on file     Alcohol use Yes      Comment: moderate     Drug use: No     Sexual activity: Not on file     Other Topics Concern     Not on file     Social History Narrative       Surgical Hx:   Past Surgical History:   Procedure Laterality Date     INCISION AND DRAINAGE FOOT, COMBINED Left 12/29/2016    Procedure: COMBINED INCISION AND DRAINAGE FOOT;  Surgeon: Mauricio Case DPM;  Location: [facility] OR     ORTHOPEDIC SURGERY         Allergies:    Allergies   Allergen Reactions     Shellfish-Derived Products        Medications:   Current Outpatient Prescriptions   Medication     order for DME     silver sulfADIAZINE (SILVADENE) 1 % cream     order for DME     insulin glargine (LANTUS) 100 UNIT/ML injection     METFORMIN HCL PO     silver sulfADIAZINE (SILVADENE) 1 % cream     insulin pen needle 31G X 6 MM     ibuprofen (ADVIL,MOTRIN) 200 MG tablet     No current facility-administered medications for this encounter.          Objective:  Vitals:  /73  Pulse 98  Temp 97.9  F (36.6  C) (Temporal)  Resp 16    A1C: 7.8 (7/2017)     General:  Patient is alert and orientated.  NAD      Dermatologic: full-thickness ulceration to the plantar aspect of the left mid foot measuring 0.9 cm x 0.9 cm x 0.1 cm. Base of the wound is granular. No strong erythema. Drainage or  malodor noted.      Vascular: DP & PT pulses are intact & regular bilaterally. Minimal edema and varicosities noted.  CFT and skin temperature is normal to both lower extremities.      Neurologic: Lower extremity sensation absent.      Musculoskeletal: Patient is ambulatory without assistive device or brace.  Rocker-bottom appearance of the left foot with collapse of the arch.     Imaging:  Xray:  Progressive Charcot arthropathy in the midfoot      Assessment: A 57-year-old diabetic male with Charcot and chronic ulceration left foot with fat layer exposed    Plan:  Wound was debrided. At this time we will switch to an iodoform foam and will look to see if his insurance covers regranex. He will continue offloading with the knee roller. Discussed possible rocker bottom shoes once this is healed up and possibly surgical removal of the plantar prominent bone to the area with tendon lengthening and flap closure. Do not want to do this normally before the wound is healed to try to prevent infection. Discussed possibly doing this before the wound heals however there is a bigger risk of a larger wound and higher risk of bone infection. He is going to think about it. Will follow up in 1 month.    Procedure:  After verbal consent, per protocol lidocaine was applied to the wound. Excisional debridement was performed on ulcer.   #15 blade was used to debride ulcer down to and including subcutaneous tissue. Bleeding controlled with light pressure.   No drainage noted.  No anesthesia was used due to neuropathy. Dry dressing applied to foot.  Patient tolerated procedure well.      Elodia Velez DPM, Podiatry/Foot and Ankle Surgery

## 2017-12-12 ENCOUNTER — HOME INFUSION (PRE-WILLOW HOME INFUSION) (OUTPATIENT)
Dept: PHARMACY | Facility: CLINIC | Age: 57
End: 2017-12-12

## 2017-12-13 ENCOUNTER — HOME INFUSION (PRE-WILLOW HOME INFUSION) (OUTPATIENT)
Dept: PHARMACY | Facility: CLINIC | Age: 57
End: 2017-12-13

## 2017-12-13 NOTE — PROGRESS NOTES
This is a recent snapshot of the patient's [facility] medical record.  For current drug dose and complete information and questions, call [phone] or In Basket pool, fv home infusion ([id number])  CSN Number:  [id number]

## 2017-12-13 NOTE — PROGRESS NOTES
This is a recent snapshot of the patient's [facility] medical record.  For current drug dose and complete information and questions, call [phone] or In Basket pool, fv home infusion ([id number])  CSN Number:  [id number]

## 2017-12-14 ENCOUNTER — HOME INFUSION (PRE-WILLOW HOME INFUSION) (OUTPATIENT)
Dept: PHARMACY | Facility: CLINIC | Age: 57
End: 2017-12-14

## 2017-12-14 NOTE — PROGRESS NOTES
This is a recent snapshot of the patient's [facility] medical record.  For current drug dose and complete information and questions, call [phone] or In Basket pool, fv home infusion ([id number])  CSN Number:  [id number]

## 2017-12-15 NOTE — PROGRESS NOTES
This is a recent snapshot of the patient's [facility] medical record.  For current drug dose and complete information and questions, call [phone] or In Basket pool, fv home infusion ([id number])  CSN Number:  [id number]

## 2017-12-17 ENCOUNTER — HOME INFUSION (PRE-WILLOW HOME INFUSION) (OUTPATIENT)
Dept: PHARMACY | Facility: CLINIC | Age: 57
End: 2017-12-17

## 2017-12-18 NOTE — PROGRESS NOTES
This is a recent snapshot of the patient's [facility] medical record.  For current drug dose and complete information and questions, call [phone] or In Basket pool, fv home infusion ([id number])  CSN Number:  [id number]

## 2017-12-20 ENCOUNTER — HOME INFUSION (PRE-WILLOW HOME INFUSION) (OUTPATIENT)
Dept: PHARMACY | Facility: CLINIC | Age: 57
End: 2017-12-20

## 2017-12-21 NOTE — PROGRESS NOTES
This is a recent snapshot of the patient's [facility] medical record.  For current drug dose and complete information and questions, call [phone] or In [facility] pool, fv home infusion ([id number])  CSN Number:  [id number]

## 2017-12-26 ENCOUNTER — HOME INFUSION (PRE-WILLOW HOME INFUSION) (OUTPATIENT)
Dept: PHARMACY | Facility: CLINIC | Age: 57
End: 2017-12-26

## 2017-12-27 ENCOUNTER — HOME INFUSION (PRE-WILLOW HOME INFUSION) (OUTPATIENT)
Dept: PHARMACY | Facility: CLINIC | Age: 57
End: 2017-12-27

## 2017-12-27 NOTE — PROGRESS NOTES
This is a recent snapshot of the patient's [facility] medical record.  For current drug dose and complete information and questions, call [phone] or In Basket pool, fv home infusion ([id number])  CSN Number:  [id number]

## 2018-01-02 ENCOUNTER — HOME INFUSION (PRE-WILLOW HOME INFUSION) (OUTPATIENT)
Dept: PHARMACY | Facility: CLINIC | Age: 58
End: 2018-01-02

## 2018-01-03 NOTE — PROGRESS NOTES
This is a recent snapshot of the patient's [facility] medical record.  For current drug dose and complete information and questions, call [phone] or In Basket pool, fv home infusion ([id number])  CSN Number:  [id number]

## 2018-01-10 ENCOUNTER — HOME INFUSION (PRE-WILLOW HOME INFUSION) (OUTPATIENT)
Dept: PHARMACY | Facility: CLINIC | Age: 58
End: 2018-01-10

## 2018-01-11 NOTE — PROGRESS NOTES
This is a recent snapshot of the patient's [facility] medical record.  For current drug dose and complete information and questions, call [phone] or In Basket pool, fv home infusion ([id number])  CSN Number:  [id number]

## 2018-01-16 ENCOUNTER — HOME INFUSION (PRE-WILLOW HOME INFUSION) (OUTPATIENT)
Dept: PHARMACY | Facility: CLINIC | Age: 58
End: 2018-01-16

## 2018-01-17 ENCOUNTER — HOME INFUSION (PRE-WILLOW HOME INFUSION) (OUTPATIENT)
Dept: PHARMACY | Facility: CLINIC | Age: 58
End: 2018-01-17

## 2018-01-17 NOTE — PROGRESS NOTES
This is a recent snapshot of the patient's [facility] medical record.  For current drug dose and complete information and questions, call [phone] or In Basket pool, fv home infusion ([id number])  CSN Number:  [id number]

## 2018-01-18 NOTE — PROGRESS NOTES
This is a recent snapshot of the patient's [facility] medical record.  For current drug dose and complete information and questions, call [phone] or In Basket pool, fv home infusion ([id number])  CSN Number:  [id number]

## 2018-01-19 ENCOUNTER — HOME INFUSION (PRE-WILLOW HOME INFUSION) (OUTPATIENT)
Dept: PHARMACY | Facility: CLINIC | Age: 58
End: 2018-01-19

## 2018-01-22 NOTE — PROGRESS NOTES
This is a recent snapshot of the patient's [facility] medical record.  For current drug dose and complete information and questions, call [phone] or In Basket pool, fv home infusion ([id number])  CSN Number:  [id number]

## 2018-09-05 NOTE — TELEPHONE ENCOUNTER
Shay persaud and LVM requesting a letter be written so that he can work from home in a seated work manner. He needs the fax number to send the form to Dr. Pitts to fill out.    Landon Sheffield ATC     84

## 2019-12-31 NOTE — PROGRESS NOTES
"Foot & Ankle Surgery   April 3, 2017    S:  Pt is seen today for evaluation of ulcer plantar L midfoot.  NWB with [employer], getting Aquacel Ag dressing changes.  Developed a blister dorsal distal L 2nd toe, not sure how this developed.    Vitals:    04/03/17 1112   Pulse: 86   Weight: 268 lb (121.6 kg)   Height: 6' 3\" (1.905 m)   '      ROS - Pos for CC.  Patient denies current nausea, vomiting, chills, fevers, belly pain, calf pain, chest pain or SOB.  Complete remainder of ROS it otherwise neg.      PE:  Gen:   No apparent distress  Neuro:   A&Ox3, no deficits  Psych:    Answering questions appropriately for age and situation with normal affect  Head:    NCAT  Eye:    Visual scanning without deficit  Ear:    Response to auditory stimuli wnl  Lung:    Non-labored breathing on RA noted  Abd:    NTND per patient report  Lymph: Neg for pitting/non-pitting edema BLE  Vasc: Pulses palpable, CFT minimally delayed  Neuro: Light touch sensation greatly diminished distally left lower extremity   Derm: Ulcer plantar L midfoot, partial thickness, 7 x 5mm, granular base after debridement. Slight erythema along medial arch noted. No deep probing, partial thickness wound.  Partial thickness blister dorsal DIPJ L 2nd toe, no deep probing or SOI  MSK: Baseline charcot changes L foot, no acute instability.  Calf: Neg for redness, swelling or tenderness    Assessment:  57 year old male with ulcer plantar L midfoot; blister L 2nd toe      Plan:  Discussed etiologies/options  1.  Ulcer plantar L midfoot  -Excisional debridement was performed to the wound, partial-thickness(limited to skin breakdown), sharply debridement the wound, excising nonviable tissue to the above dimensions with a tissue nipper  -continue Aquacel Ag  -NWB with [employer]  -briefly discussed long-term offloading and surgical options     2.  Blister L 2nd toe  -no debridedment needed  -wash/dry, abx ointment/bandaid    Follow up:  2 weeks      Body mass index is " 33.5 kg/(m^2).  Weight management plan: Patient was referred to their PCP to discuss a diet and exercise plan.         Deion Pitts DPM   Podiatric Foot & Ankle Surgeon  [facility]  [phone]   c/o chest pain x 3 days and I have 3 clogged heart valves. Subjective fever x 4 days and cough

## 2021-12-03 ENCOUNTER — MEDICAL CORRESPONDENCE (OUTPATIENT)
Dept: HEALTH INFORMATION MANAGEMENT | Facility: CLINIC | Age: 61
End: 2021-12-03
Payer: COMMERCIAL

## 2021-12-03 ENCOUNTER — TRANSFERRED RECORDS (OUTPATIENT)
Dept: HEALTH INFORMATION MANAGEMENT | Facility: CLINIC | Age: 61
End: 2021-12-03
Payer: COMMERCIAL

## 2021-12-03 ENCOUNTER — TELEPHONE (OUTPATIENT)
Dept: WOUND CARE | Facility: CLINIC | Age: 61
End: 2021-12-03
Payer: COMMERCIAL

## 2021-12-03 DIAGNOSIS — S91.109A OPEN WOUND OF TOE, INITIAL ENCOUNTER: Primary | ICD-10-CM

## 2021-12-03 NOTE — TELEPHONE ENCOUNTER
Consult received via fax from provider Dr. Rui Hernandez for ulcer of the left great toe.  Per Standing order Patient qualifies for MICHAEL to be scheduled prior to assessment with Dr. Velez at [facility] [place] at [facility].  Please also ask patient to have an A1C completed before his appointment with Dr. Velez.  Routing to  Pari Lim.

## 2021-12-06 NOTE — TELEPHONE ENCOUNTER
Scheduled for 1/14/22.         Pari Lim    Formerly named [facility]   [phone]

## 2022-01-14 ENCOUNTER — HOSPITAL ENCOUNTER (OUTPATIENT)
Dept: WOUND CARE | Facility: CLINIC | Age: 62
End: 2022-01-14
Attending: PODIATRIST
Payer: COMMERCIAL

## 2022-01-14 ENCOUNTER — HOSPITAL ENCOUNTER (OUTPATIENT)
Dept: ULTRASOUND IMAGING | Facility: CLINIC | Age: 62
End: 2022-01-14
Attending: PODIATRIST
Payer: COMMERCIAL

## 2022-01-14 VITALS
HEART RATE: 77 BPM | DIASTOLIC BLOOD PRESSURE: 85 MMHG | RESPIRATION RATE: 18 BRPM | SYSTOLIC BLOOD PRESSURE: 147 MMHG | HEIGHT: 75 IN | BODY MASS INDEX: 35.36 KG/M2 | TEMPERATURE: 97.1 F | WEIGHT: 284.4 LBS

## 2022-01-14 DIAGNOSIS — M20.5X2 HALLUX LIMITUS, LEFT: ICD-10-CM

## 2022-01-14 DIAGNOSIS — E11.42 DIABETIC POLYNEUROPATHY ASSOCIATED WITH TYPE 2 DIABETES MELLITUS (H): ICD-10-CM

## 2022-01-14 DIAGNOSIS — L97.522 SKIN ULCER OF TOE OF LEFT FOOT WITH FAT LAYER EXPOSED (H): Primary | ICD-10-CM

## 2022-01-14 DIAGNOSIS — L97.522 DIABETIC ULCER OF TOE OF LEFT FOOT ASSOCIATED WITH TYPE 2 DIABETES MELLITUS, WITH FAT LAYER EXPOSED (H): ICD-10-CM

## 2022-01-14 DIAGNOSIS — Z89.412 HISTORY OF PARTIAL RAY AMPUTATION OF LEFT GREAT TOE (H): ICD-10-CM

## 2022-01-14 DIAGNOSIS — E11.621 DIABETIC ULCER OF TOE OF LEFT FOOT ASSOCIATED WITH TYPE 2 DIABETES MELLITUS, WITH FAT LAYER EXPOSED (H): ICD-10-CM

## 2022-01-14 DIAGNOSIS — S91.109A OPEN WOUND OF TOE, INITIAL ENCOUNTER: ICD-10-CM

## 2022-01-14 PROBLEM — E11.9 TYPE 2 DIABETES MELLITUS (H): Status: ACTIVE | Noted: 2017-03-07

## 2022-01-14 PROBLEM — L97.509 TYPE 2 DIABETES MELLITUS WITH FOOT ULCER, WITH LONG-TERM CURRENT USE OF INSULIN (H): Status: ACTIVE | Noted: 2017-11-18

## 2022-01-14 PROBLEM — M14.672 CHARCOT'S JOINT OF LEFT FOOT: Status: ACTIVE | Noted: 2019-02-27

## 2022-01-14 PROBLEM — I10 ESSENTIAL HYPERTENSION: Status: ACTIVE | Noted: 2017-11-18

## 2022-01-14 PROBLEM — M86.9 OSTEOMYELITIS OF LEFT FOOT, UNSPECIFIED TYPE (H): Status: ACTIVE | Noted: 2022-01-14

## 2022-01-14 PROBLEM — A49.01 MSSA (METHICILLIN SUSCEPTIBLE STAPHYLOCOCCUS AUREUS) INFECTION: Status: ACTIVE | Noted: 2022-01-14

## 2022-01-14 PROBLEM — Z79.4 TYPE 2 DIABETES MELLITUS WITH FOOT ULCER, WITH LONG-TERM CURRENT USE OF INSULIN (H): Status: ACTIVE | Noted: 2017-11-18

## 2022-01-14 PROBLEM — L97.422 DIABETIC ULCER OF LEFT MIDFOOT ASSOCIATED WITH TYPE 2 DIABETES MELLITUS, WITH FAT LAYER EXPOSED (H): Status: ACTIVE | Noted: 2017-11-18

## 2022-01-14 PROBLEM — G63 POLYNEUROPATHY ASSOCIATED WITH UNDERLYING DISEASE (H): Status: ACTIVE | Noted: 2017-11-18

## 2022-01-14 PROBLEM — J45.20 INTERMITTENT ASTHMA: Status: ACTIVE | Noted: 2022-01-14

## 2022-01-14 PROBLEM — E16.2 HYPOGLYCEMIA: Status: ACTIVE | Noted: 2017-11-18

## 2022-01-14 PROCEDURE — 11042 DBRDMT SUBQ TIS 1ST 20SQCM/<: CPT | Performed by: PODIATRIST

## 2022-01-14 PROCEDURE — 99203 OFFICE O/P NEW LOW 30 MIN: CPT | Mod: 25 | Performed by: PODIATRIST

## 2022-01-14 PROCEDURE — 93922 UPR/L XTREMITY ART 2 LEVELS: CPT

## 2022-01-14 PROCEDURE — 11042 DBRDMT SUBQ TIS 1ST 20SQCM/<: CPT

## 2022-01-14 PROCEDURE — G0463 HOSPITAL OUTPT CLINIC VISIT: HCPCS | Mod: 25

## 2022-01-14 RX ORDER — L. ACIDOPHILUS/BIFIDO. LONGUM 15 MG
CAPSULE,DELAYED RELEASE (ENTERIC COATED) ORAL
COMMUNITY
Start: 2021-10-27

## 2022-01-14 RX ORDER — ATORVASTATIN CALCIUM 20 MG/1
20 TABLET, FILM COATED ORAL
COMMUNITY
Start: 2021-03-16

## 2022-01-14 RX ORDER — ASPIRIN 81 MG/1
81 TABLET, CHEWABLE ORAL
COMMUNITY
Start: 2021-09-01

## 2022-01-14 ASSESSMENT — MIFFLIN-ST. JEOR: SCORE: 2175.66

## 2022-01-14 NOTE — DISCHARGE INSTRUCTIONS
Riki Zepeda      1960    Wound Dressing Change:Left Great Toe  Cleanse wound and surrounding skin with: soap and water   Cover wound with endoform antimicrobial and hydrofera blue ready transfer  Change dressing daily  You need to stay off your foot at all times to help heal your wound.  You should wear your offloading shoe at all times.  Use Knee roller to keep your weight off your foot.    The key to healing your wound is to keeping your blood sugars under good control.     Please call [facility] [phone] ([facility] [address] [place], [place]) to schedule your MRI appointment if you have not heard from them in two business days.    You have been referred to [facility] for offloading shoe. Please contact them at [phone] to set up an apt to get your orthotic. They are located in our building [address].             ZULAY MoseleyP.NIRANJAN. January 14, 2022    Call us at [phone] if you have any questions about your wounds, have redness or swelling around your wound, have a fever of 101 or greater or if you have any other problems or concerns. We answer the phone Monday through Friday 8 am to 4 pm, please leave a message as we check the voicemail frequently throughout the day.     If you had a positive experience please indicate on your patient satisfaction survey form that [facility] will be sending you.    If you have any billing related questions please call the [facility] Business office at [phone]. The clinic staff does not handle billing related matters.

## 2022-01-14 NOTE — PROGRESS NOTES
Patient arrived for wound care visit. Certified Wound Care Nurse time spent evaluating patient record, completed a full evaluation and documented wound(s) & rajesh-wound skin; provided recommendation based on treatment plan. Applied dressing, reviewed discharge instructions, patient education, and discussed plan of care with appropriate medical team staff members and patient and/or family members.

## 2022-01-14 NOTE — PROGRESS NOTES
[facility] [place] Progress Note    Subject: Patient was seen at wound center.  Patient presents the clinic for chronic left great toe ulceration.  Here with his wife.  Notes that he has had it open for 2 years.  He states that he had a partial toe amputation at [facility] in September 2021 and the wound came back.  Patient denies fever, nausea, vomiting.  Has been putting Xeroform on the wound for dressing changes.  Patient is diabetic.  States that his blood sugar yesterday was 152.  Currently is in a pair of crocs with no offloading in his shoes.    PMH:   Past Medical History:   Diagnosis Date     Diabetes (H)        Social Hx:   Social History     Socioeconomic History     Marital status:      Spouse name: Not on file     Number of children: Not on file     Years of education: Not on file     Highest education level: Not on file   Occupational History     Not on file   Tobacco Use     Smoking status: Current Some Day Smoker     Smokeless tobacco: Not on file   Substance and Sexual Activity     Alcohol use: Yes     Comment: moderate     Drug use: No     Sexual activity: Not on file   Other Topics Concern     Not on file   Social History Narrative     Not on file     Social Determinants of Health     Financial Resource Strain: Not on file   Food Insecurity: Not on file   Transportation Needs: Not on file   Physical Activity: Not on file   Stress: Not on file   Social Connections: Not on file   Intimate Partner Violence: Not on file   Housing Stability: Not on file       Surgical Hx:   Past Surgical History:   Procedure Laterality Date     INCISION AND DRAINAGE FOOT, COMBINED Left 12/29/2016    Procedure: COMBINED INCISION AND DRAINAGE FOOT;  Surgeon: Mauricio Case DPM;  Location:  OR     ORTHOPEDIC SURGERY         Allergies:    Allergies   Allergen Reactions     Shellfish-Derived Products        Medications:   Current Outpatient Medications   Medication     becaplermin  (REGRANEX) 0.01 % topical gel     ibuprofen (ADVIL,MOTRIN) 200 MG tablet     insulin glargine (LANTUS) 100 UNIT/ML injection     insulin pen needle 31G X 6 MM     METFORMIN HCL PO     order for DME     order for DME     silver sulfADIAZINE (SILVADENE) 1 % cream     silver sulfADIAZINE (SILVADENE) 1 % cream     No current facility-administered medications for this encounter.         Objective:  Vitals:  There were no vitals taken for this visit.    A1C: 7.8 (10/2021)    General:  Patient is alert and orientated.  NAD     Dermatologic: Full-thickness ulceration to the plantar aspect of the left remaining great toe.  Please see measurements below after debridement.  Base of the wound is granular.  No redness, purulent drainage or malodor noted but heavy amounts of clear serous drainage is noted with maceration around the wound bed.    Wound (used by OP [facility] only) 01/14/22 0856 Left (Active)   Thickness/Stage full thickness 01/14/22 0800   Dressing Appearance moist drainage 01/14/22 0800   Base granulating 01/14/22 0800   Periwound intact 01/14/22 0800   Periwound Temperature warm 01/14/22 0800   Periwound Skin Turgor soft 01/14/22 0800   Edges callused 01/14/22 0800   Length (cm) 2 01/14/22 0800   Width (cm) 2 01/14/22 0800   Depth (cm) 0.3 01/14/22 0800   Wound (cm^2) 4 cm^2 01/14/22 0800   Wound Volume (cm^3) 1.2 cm^3 01/14/22 0800   Drainage Characteristics/Odor serosanguineous 01/14/22 0800   Drainage Amount moderate 01/14/22 0800   Care, Wound debrided 01/14/22 0800        Vascular: DP & PT pulses are intact & regular bilaterally.  No significant edema or varicosities noted.  CFT and skin temperature is normal to both lower extremities.     Neurologic: Lower extremity sensation is diminished to feet.     Musculoskeletal: Patient is ambulatory without assistive device or brace.  Partial left great toe amputation.    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  Please see procedure note below.  Discussed that this is a pressure area and the biggest issue is trying to get pressure off of the area to help this heal.  Recommended DH to shoe that he wears at all times even around the house and does not go barefoot or in socks.  Due to the maceration around the wound recommend endoform and Hydrofera Blue dressing changes daily with 4 x 4 gauze, gauze roll and Ace bandage.  We will also order baseline x-rays as he has not had any since September before his surgery and we do not have any in our system.  Want to assess for deeper infection and get baseline pictures.  We will have him follow-up in 1 month for reassessment.  All questions were answered to patient and patient's wife satisfaction and they will call further questions or concerns.    Procedure:  After verbal consent, per protocol lidocaine was applied to the wound. Excisional debridement was performed on ulcer.   #15 blade was used to debride ulcer down to and including subcutaneous tissue. Bleeding controlled with light pressure.  No drainage noted.   < 20sq cm debrided.  Dry dressing applied to foot.  Patient tolerated procedure well.    Elodia Velez DPM, Podiatry/Foot and Ankle Surgery                Further instructions from your care team       Riki Zepeda      1960    Wound Dressing Change:Left Great Toe  Cleanse wound and surrounding skin with: soap and water   Cover wound with endoform antimicrobial and hydrofera blue ready transfer  Change dressing daily  You need to stay off your foot at all times to help heal your wound.  You should wear your offloading shoe at all times.  Use Knee roller to keep  your weight off your foot.    The key to healing your wound is to keeping your blood sugars under good control.     Please call [facility] [phone] ([facility] [place]) to schedule your MRI appointment if you have not heard from them in two business days.    You have been referred to [facility] for offloading shoe. Please contact them at [phone] to set up an apt to get your orthotic. They are located in our building [address].             Elodia Velez D.P.M.. January 14, 2022    Call us at [phone] if you have any questions about your wounds, have redness or swelling around your wound, have a fever of 101 or greater or if you have any other problems or concerns. We answer the phone Monday through Friday 8 am to 4 pm, please leave a message as we check the voicemail frequently throughout the day.     If you had a positive experience please indicate on your patient satisfaction survey form that [facility] will be sending you.    If you have any billing related questions please call the [facility] Business office at [phone]. The clinic staff does not handle billing related matters.

## 2022-02-11 ENCOUNTER — HOSPITAL ENCOUNTER (OUTPATIENT)
Dept: WOUND CARE | Facility: CLINIC | Age: 62
Discharge: HOME | End: 2022-02-11
Attending: PODIATRIST | Admitting: PODIATRIST
Payer: COMMERCIAL

## 2022-02-11 VITALS
RESPIRATION RATE: 20 BRPM | TEMPERATURE: 97 F | SYSTOLIC BLOOD PRESSURE: 158 MMHG | HEART RATE: 83 BPM | DIASTOLIC BLOOD PRESSURE: 86 MMHG

## 2022-02-11 DIAGNOSIS — E11.621 DIABETIC ULCER OF TOE OF LEFT FOOT ASSOCIATED WITH TYPE 2 DIABETES MELLITUS, WITH FAT LAYER EXPOSED (H): ICD-10-CM

## 2022-02-11 DIAGNOSIS — M14.672 CHARCOT'S JOINT OF LEFT FOOT: ICD-10-CM

## 2022-02-11 DIAGNOSIS — S98.112A AMPUTATION OF LEFT GREAT TOE (H): ICD-10-CM

## 2022-02-11 DIAGNOSIS — L97.522 DIABETIC ULCER OF TOE OF LEFT FOOT ASSOCIATED WITH TYPE 2 DIABETES MELLITUS, WITH FAT LAYER EXPOSED (H): ICD-10-CM

## 2022-02-11 DIAGNOSIS — E11.42 DIABETIC POLYNEUROPATHY ASSOCIATED WITH TYPE 2 DIABETES MELLITUS (H): ICD-10-CM

## 2022-02-11 DIAGNOSIS — M20.5X2 HALLUX LIMITUS OF LEFT FOOT: ICD-10-CM

## 2022-02-11 DIAGNOSIS — M21.372 LEFT FOOT DROP: ICD-10-CM

## 2022-02-11 PROCEDURE — 99213 OFFICE O/P EST LOW 20 MIN: CPT | Mod: 25 | Performed by: PODIATRIST

## 2022-02-11 PROCEDURE — 11042 DBRDMT SUBQ TIS 1ST 20SQCM/<: CPT

## 2022-02-11 PROCEDURE — 11042 DBRDMT SUBQ TIS 1ST 20SQCM/<: CPT | Performed by: PODIATRIST

## 2022-02-11 NOTE — DISCHARGE INSTRUCTIONS
Riki Zepeda      1960    Wound Dressing Change:Left Great Toe  Cleanse wound and surrounding skin with: soap and water  Cover wound with endoform antimicrobial and hydrofera blue ready transfer and then apply band aid  Change dressing daily    You need to stay off your foot at all times to help heal your wound.  You should wear your offloading shoe at all times.  Use Knee roller to keep your weight off your foot.  The key to healing your wound is to keeping your blood sugars under good control.         DEA Moseley.P.M. February 11, 2022    Call us at [phone] if you have any questions about your wounds, have redness or swelling around your wound, have a fever of 101 or greater or if you have any other problems or concerns. We answer the phone Monday through Friday 8 am to 4 pm, please leave a message as we check the voicemail frequently throughout the day.     If you had a positive experience please indicate on your patient satisfaction survey form that  [facility] will be sending you.    If you have any billing related questions please call the  [facility] Business office at [phone]. The clinic staff does not handle billing related matters.

## 2022-02-11 NOTE — PROGRESS NOTES
[facility] [place] Progress Note    Subject: Patient was seen at wound center. Patient presents today for follow-up on left great toe ulceration. They have been doing endoform and Hydrofera Blue dressing changes. Denies fever, nausea, vomiting. Patient is in crocs today. Notes that he wears inserts with a cut out at home to help offload the ulcer.    PMH:   Past Medical History:   Diagnosis Date     Diabetes (H)        Social Hx:   Social History     Socioeconomic History     Marital status:      Spouse name: Not on file     Number of children: Not on file     Years of education: Not on file     Highest education level: Not on file   Occupational History     Not on file   Tobacco Use     Smoking status: Current Some Day Smoker     Smokeless tobacco: Not on file   Substance and Sexual Activity     Alcohol use: Yes     Comment: moderate     Drug use: No     Sexual activity: Not on file   Other Topics Concern     Not on file   Social History Narrative     Not on file     Social Determinants of Health     Financial Resource Strain: Not on file   Food Insecurity: Not on file   Transportation Needs: Not on file   Physical Activity: Not on file   Stress: Not on file   Social Connections: Not on file   Intimate Partner Violence: Not on file   Housing Stability: Not on file       Surgical Hx:   Past Surgical History:   Procedure Laterality Date     INCISION AND DRAINAGE FOOT, COMBINED Left 12/29/2016    Procedure: COMBINED INCISION AND DRAINAGE FOOT;  Surgeon: Mauricio Case DPM;  Location: [facility] OR     ORTHOPEDIC SURGERY         Allergies:    Allergies   Allergen Reactions     Hydromorphone Hives     Shellfish-Derived Products        Medications:   Current Outpatient Medications   Medication     aspirin (ASA) 81 MG chewable tablet     atorvastatin (LIPITOR) 20 MG tablet     blood glucose ([employer] BLOOD GLUCOSE TEST) test strip     ibuprofen (ADVIL,MOTRIN) 200 MG tablet     insulin glargine (LANTUS)  100 UNIT/ML injection     insulin pen needle 31G X 6 MM     METFORMIN HCL PO     order for DME     order for DME     silver sulfADIAZINE (SILVADENE) 1 % cream     No current facility-administered medications for this encounter.         Objective:  Vitals:  BP (!) 158/86 (BP Location: Right arm, Patient Position: Sitting)   Pulse 83   Temp 97  F (36.1  C) (Temporal)   Resp 20     A1C: 7.8 (10/2021)     General:  Patient is alert and orientated.  NAD      Dermatologic: Full-thickness ulceration to the plantar aspect of the left remaining great toe.  Please see measurements below after debridement.  Base of the wound is granular.  No redness, purulent drainage or malodor noted but heavy amounts of clear serous drainage is noted with maceration around the wound bed.    Wound (used by OP [facility] only) 01/14/22 0856 Left (Active)   Thickness/Stage full thickness 02/11/22 0906   Dressing Appearance moist drainage 02/11/22 0906   Base granulating;slough 02/11/22 0906   Periwound intact 02/11/22 0906   Periwound Temperature warm 02/11/22 0906   Periwound Skin Turgor soft 02/11/22 0906   Edges callused 02/11/22 0906   Length (cm) 1 02/11/22 0906   Width (cm) 0.9 02/11/22 0906   Depth (cm) 0.2 02/11/22 0906   Wound (cm^2) 0.9 cm^2 02/11/22 0906   Wound Volume (cm^3) 0.18 cm^3 02/11/22 0906   Wound healing % 77.5 02/11/22 0906   Undermining [Depth (cm)/Location] 12-12= 0.6cm 02/11/22 0906   Drainage Characteristics/Odor serosanguineous 02/11/22 0906   Drainage Amount moderate 02/11/22 0906   Care, Wound debrided 02/11/22 0906     Vascular: DP & PT pulses are intact & regular bilaterally.  No significant edema or varicosities noted.  CFT and skin temperature is normal to both lower extremities.     Neurologic: Lower extremity sensation is diminished to feet.     Musculoskeletal: Patient is ambulatory without assistive device or brace.  Partial left great toe amputation.     MICHAEL:   triphasic     Assessment:     Diabetic ulcer of  toe of left foot associated with type 2 diabetes mellitus, with fat layer exposed (H)  Diabetic polyneuropathy associated with type 2 diabetes mellitus (H)  Charcot's joint of left foot  Left foot drop  Hallux limitus of left foot  Amputation of left great toe (H)        Plan: At this time the ulcer was debrided.  Please see procedure note below.   Continue to wear the DH II shoe that he wears at all times even around the house and does not go barefoot or in socks.   Continue daily dressing changes of endoform and Hydrofera Blue dressing changes daily with 4 x 4 gauze, gauze roll and Ace bandage.   Patient significant the x-rays that were ordered at last visit. We had also talked about an MRI but he states that he would not go into any MRI even if it is an open machine due to his claustrophobia.  We will have him follow-up in 1 month for reassessment.  All questions were answered to patient and patient's wife satisfaction and they will call further questions or concerns.     Procedure:  After verbal consent, per protocol lidocaine was applied to the wound. Excisional debridement was performed on ulcer.   #15 blade was used to debride ulcer down to and including subcutaneous tissue. Bleeding controlled with light pressure.  No drainage noted.   < 20sq cm debrided.  Dry dressing applied to foot.  Patient tolerated procedure well.    Elodia Velez DPM, Podiatry/Foot and Ankle Surgery            Further instructions from your care team       Riki Zepeda      1960    Wound Dressing Change:Left Great Toe  Cleanse wound and surrounding skin with: soap and water  Cover wound with endoform antimicrobial and hydrofera blue ready transfer and then apply band aid  Change dressing daily    You need to stay off your foot at all times to help heal your wound.  You should wear your offloading shoe at all times.  Use Knee roller to keep your weight off your foot.  The key to healing your wound is to keeping your blood  sugars under good control.         Elodia Velez D.P.M. February 11, 2022    Call us at [phone] if you have any questions about your wounds, have redness or swelling around your wound, have a fever of 101 or greater or if you have any other problems or concerns. We answer the phone Monday through Friday 8 am to 4 pm, please leave a message as we check the voicemail frequently throughout the day.     If you had a positive experience please indicate on your patient satisfaction survey form that [facility] will be sending you.    If you have any billing related questions please call the  [facility] Business office at [phone]. The clinic staff does not handle billing related matters.

## 2022-03-11 ENCOUNTER — HOSPITAL ENCOUNTER (OUTPATIENT)
Dept: WOUND CARE | Facility: CLINIC | Age: 62
Discharge: HOME | End: 2022-03-11
Attending: PODIATRIST | Admitting: PODIATRIST
Payer: COMMERCIAL

## 2022-03-11 VITALS
SYSTOLIC BLOOD PRESSURE: 149 MMHG | TEMPERATURE: 97.4 F | HEART RATE: 69 BPM | RESPIRATION RATE: 18 BRPM | DIASTOLIC BLOOD PRESSURE: 86 MMHG

## 2022-03-11 DIAGNOSIS — E11.621 DIABETIC ULCER OF TOE OF LEFT FOOT ASSOCIATED WITH TYPE 2 DIABETES MELLITUS, WITH FAT LAYER EXPOSED (H): ICD-10-CM

## 2022-03-11 DIAGNOSIS — L97.522 DIABETIC ULCER OF TOE OF LEFT FOOT ASSOCIATED WITH TYPE 2 DIABETES MELLITUS, WITH FAT LAYER EXPOSED (H): ICD-10-CM

## 2022-03-11 DIAGNOSIS — S98.112A AMPUTATION OF LEFT GREAT TOE (H): ICD-10-CM

## 2022-03-11 DIAGNOSIS — E11.42 DIABETIC POLYNEUROPATHY ASSOCIATED WITH TYPE 2 DIABETES MELLITUS (H): ICD-10-CM

## 2022-03-11 DIAGNOSIS — M21.372 LEFT FOOT DROP: ICD-10-CM

## 2022-03-11 DIAGNOSIS — M20.5X2 HALLUX LIMITUS OF LEFT FOOT: ICD-10-CM

## 2022-03-11 PROCEDURE — 11042 DBRDMT SUBQ TIS 1ST 20SQCM/<: CPT | Performed by: PODIATRIST

## 2022-03-11 PROCEDURE — 2894A VOIDCORRECT: CPT | Mod: 25 | Performed by: PODIATRIST

## 2022-03-11 PROCEDURE — 11042 DBRDMT SUBQ TIS 1ST 20SQCM/<: CPT

## 2022-03-11 RX ORDER — LANCETS
EACH MISCELLANEOUS
COMMUNITY
Start: 2022-03-10

## 2022-03-11 NOTE — DISCHARGE INSTRUCTIONS
Riki Zepeda      1960    Wound Dressing Change: Left Great Toe  Cleanse with mild unscented soap and water.  Apply hydrofera blue ready transfer to wound and then apply band aid  Change dressing daily    You need to stay off your foot at all times to help heal your wound.  You should wear your offloading boot at all times.  Use Knee roller to keep your weight off your foot.  The key to healing your wound is to keeping your blood sugars under good control    Please call [facility] [phone] ([facility] [address] [place], [place]) to schedule your Left toe bone scan appointment if you have not heard from them in two business days.     Elodia Velez D.P.M. March 11, 2022    Call us at [phone] if you have any questions about your wounds, have redness or swelling around your wound, have a fever of 101 or greater or if you have any other problems or concerns. We answer the phone Monday through Friday 8 am to 4 pm, please leave a message as we check the voicemail frequently throughout the day.     If you had a positive experience please indicate on your patient satisfaction survey form that [facility] will be sending you.    If you have any billing related questions please call the [facility] Business office at [phone]. The clinic staff does not handle billing related matters.

## 2022-03-11 NOTE — PROGRESS NOTES
[facility] Progress Note    Subject: Patient was seen at wound center.  Patient presents today for follow-up on left great toe ulceration. They have been doing endoform and Hydrofera Blue dressing changes. Denies fever, nausea, vomiting. Patient is in crocs today. Notes that he wears inserts with a cut out at home to help offload the ulcer.    PMH:   Past Medical History:   Diagnosis Date     Diabetes (H)        Social Hx:   Social History     Socioeconomic History     Marital status:      Spouse name: Not on file     Number of children: Not on file     Years of education: Not on file     Highest education level: Not on file   Occupational History     Not on file   Tobacco Use     Smoking status: Current Some Day Smoker     Smokeless tobacco: Not on file   Substance and Sexual Activity     Alcohol use: Yes     Comment: moderate     Drug use: No     Sexual activity: Not on file   Other Topics Concern     Not on file   Social History Narrative     Not on file     Social Determinants of Health     Financial Resource Strain: Not on file   Food Insecurity: Not on file   Transportation Needs: Not on file   Physical Activity: Not on file   Stress: Not on file   Social Connections: Not on file   Intimate Partner Violence: Not on file   Housing Stability: Not on file       Surgical Hx:   Past Surgical History:   Procedure Laterality Date     INCISION AND DRAINAGE FOOT, COMBINED Left 12/29/2016    Procedure: COMBINED INCISION AND DRAINAGE FOOT;  Surgeon: Mauricio Case DPM;  Location: [facility] OR     ORTHOPEDIC SURGERY         Allergies:    Allergies   Allergen Reactions     Hydromorphone Hives     Shellfish-Derived Products        Medications:   Current Outpatient Medications   Medication     aspirin (ASA) 81 MG chewable tablet     atorvastatin (LIPITOR) 20 MG tablet     blood glucose ([employer] BLOOD GLUCOSE TEST) test strip     blood glucose monitoring (ACCU-CHEK FASTCLIX) lancets     ibuprofen  (ADVIL,MOTRIN) 200 MG tablet     insulin glargine (LANTUS) 100 UNIT/ML injection     insulin pen needle 31G X 6 MM     METFORMIN HCL PO     order for DME     order for DME     silver sulfADIAZINE (SILVADENE) 1 % cream     No current facility-administered medications for this encounter.         Objective:  Vitals:  BP (!) 149/86 (BP Location: Left arm, Patient Position: Sitting, Cuff Size: Adult Large)   Pulse 69   Temp 97.4  F (36.3  C) (Temporal)   Resp 18     A1C: 7.8 (10/2021)     General:  Patient is alert and orientated.  NAD      Dermatologic: Full-thickness ulceration to the plantar aspect of the left remaining great toe.  Please see measurements below after debridement.  Base of the wound is granular.  No redness, purulent drainage or malodor noted but heavy amounts of clear serous drainage is noted with maceration around the wound bed.  .   Wound (used by OP [facility] only) 01/14/22 0856 Left (Active)   Thickness/Stage full thickness 03/11/22 0935   Base granulating;slough 03/11/22 0935   Periwound intact 03/11/22 0935   Periwound Temperature warm 03/11/22 0935   Periwound Skin Turgor soft 03/11/22 0935   Edges callused 03/11/22 0935   Length (cm) 0.4 03/11/22 0935   Width (cm) 0.8 03/11/22 0935   Depth (cm) 0.6 03/11/22 0935   Wound (cm^2) 0.32 cm^2 03/11/22 0935   Wound Volume (cm^3) 0.19 cm^3 03/11/22 0935   Wound healing % 92 03/11/22 0935   Undermining [Depth (cm)/Location] 12-12 O'clock/0.5cm 03/11/22 0935   Drainage Characteristics/Odor serosanguineous 03/11/22 0935   Drainage Amount moderate 03/11/22 0935   Care, Wound debrided 03/11/22 0935     Vascular: DP & PT pulses are intact & regular bilaterally.  No significant edema or varicosities noted.  CFT and skin temperature is normal to both lower extremities.     Neurologic: Lower extremity sensation is diminished to feet.     Musculoskeletal: Patient is ambulatory without assistive device or brace.  Partial left great toe  amputation.     MICHAEL:   triphasic     Assessment:     Diabetic ulcer of toe of left foot associated with type 2 diabetes mellitus, with fat layer exposed (H)  Diabetic polyneuropathy associated with type 2 diabetes mellitus (H)  Charcot's joint of left foot  Left foot drop  Hallux limitus of left foot  Amputation of left great toe (H)        Plan: At this time the ulcer was debrided.  Please see procedure note below.   We will have him do daily dressing changes of Hydrofera Blue dressing changes daily with 4 x 4 gauze, gauze roll and Ace bandage.  We will have him hold off on the endoform.  Patient still has not gotten his x-rays that were ordered 2 visits ago.  At this point I discussed am concerned that there may be deeper infection in the bone.  Patient notes he cannot get an MRI because of claustrophobia so I put in an order for a bone scan.  Again discussed with patient the importance of offloading this area.  He presents today in [facility].  He does state that he wears his offloading shoe all the time however I recommend that he go into a tall Aircast boot.  He states that he has those at home but he does not like to wear them.  I recommended that he still wear the tall boot as this can get more pressure off of it.  Also talked about a knee roller which he says he has but he also does not want to use.  We will call him with the bone scan results.  We will have him follow-up in 1 month for reassessment.  All questions were answered to patient and patient's wife satisfaction and they will call further questions or concerns.     Procedure:  After verbal consent, per protocol lidocaine was applied to the wound. Excisional debridement was performed on ulcer.   #15 blade was used to debride ulcer down to and including subcutaneous tissue. Bleeding controlled with light pressure.  No drainage noted.   < 20sq cm debrided.  Dry dressing applied to foot.  Patient tolerated procedure well.    Elodia Velez DPM,  Podiatry/Foot and Ankle Surgery            Further instructions from your care team       Riki Zepeda      1960    Wound Dressing Change: Left Great Toe  Cleanse with mild unscented soap and water.  Apply hydrofera blue ready transfer to wound and then apply band aid  Change dressing daily    You need to stay off your foot at all times to help heal your wound.  You should wear your offloading boot at all times.  Use Knee roller to keep your weight off your foot.  The key to healing your wound is to keeping your blood sugars under good control    Please call [facility] [phone] ([facility] [place] [place]) to schedule your Left toe bone scan appointment if you have not heard from them in two business days.     DEA Moseley.P.M. March 11, 2022    Call us at [phone] if you have any questions about your wounds, have redness or swelling around your wound, have a fever of 101 or greater or if you have any other problems or concerns. We answer the phone Monday through Friday 8 am to 4 pm, please leave a message as we check the voicemail frequently throughout the day.     If you had a positive experience please indicate on your patient satisfaction survey form that [employer] will be sending you.    If you have any billing related questions please call the [employer] office at [phone]. The clinic staff does not handle billing related matters.

## 2022-12-03 ENCOUNTER — HEALTH MAINTENANCE LETTER (OUTPATIENT)
Age: 62
End: 2022-12-03

## 2023-04-12 ENCOUNTER — TRANSFERRED RECORDS (OUTPATIENT)
Dept: HEALTH INFORMATION MANAGEMENT | Facility: CLINIC | Age: 63
End: 2023-04-12

## 2023-06-11 ENCOUNTER — HEALTH MAINTENANCE LETTER (OUTPATIENT)
Age: 63
End: 2023-06-11

## 2024-01-07 ENCOUNTER — HEALTH MAINTENANCE LETTER (OUTPATIENT)
Age: 64
End: 2024-01-07

## 2024-03-17 ENCOUNTER — HEALTH MAINTENANCE LETTER (OUTPATIENT)
Age: 64
End: 2024-03-17

## 2024-08-04 ENCOUNTER — HEALTH MAINTENANCE LETTER (OUTPATIENT)
Age: 64
End: 2024-08-04

## 2025-01-21 NOTE — PROGRESS NOTES
Dr Amaya notified Pt with hx  [place] , [place] 1999. Last Saw Dr Nazario 1/2022 . Pt denies any cardiac complaints/symptoms.Last ECHO 3/2021 EF 55-60%. BMI 56.86. OK to proceed with EGD/colon per Dr Amaya and pt will be evaluated DOS whether or not to be done in ENDO/OR   This is a recent snapshot of the patient's [facility] medical record.  For current drug dose and complete information and questions, call [phone] or In Basket pool, [facility] home infusion ([id number])  CSN Number:  [id number]

## 2025-02-22 ENCOUNTER — HEALTH MAINTENANCE LETTER (OUTPATIENT)
Age: 65
End: 2025-02-22